# Patient Record
Sex: FEMALE | Race: WHITE | NOT HISPANIC OR LATINO | Employment: UNEMPLOYED | URBAN - METROPOLITAN AREA
[De-identification: names, ages, dates, MRNs, and addresses within clinical notes are randomized per-mention and may not be internally consistent; named-entity substitution may affect disease eponyms.]

---

## 2023-07-31 ENCOUNTER — HOSPITAL ENCOUNTER (OUTPATIENT)
Dept: DATA CONVERSION | Facility: HOSPITAL | Age: 28
Discharge: OTHER INSTITUTION | End: 2023-07-31

## 2023-07-31 DIAGNOSIS — Z53.21 PROCEDURE AND TREATMENT NOT CARRIED OUT DUE TO PATIENT LEAVING PRIOR TO BEING SEEN BY HEALTH CARE PROVIDER: Primary | ICD-10-CM

## 2023-09-25 PROBLEM — F17.210 CIGARETTE SMOKER: Status: ACTIVE | Noted: 2023-09-25

## 2023-09-25 PROBLEM — N80.9 ENDOMETRIOSIS: Status: ACTIVE | Noted: 2023-09-25

## 2023-09-25 PROBLEM — R87.811 VAGINAL HIGH RISK HUMAN PAPILLOMAVIRUS (HPV) DNA TEST POSITIVE: Status: ACTIVE | Noted: 2023-09-25

## 2023-09-25 PROBLEM — N94.6 DYSMENORRHEA: Status: ACTIVE | Noted: 2023-09-25

## 2023-09-25 PROBLEM — R87.610 ATYP SQUAM CELL OF UNDET SIGNFC CYTO SMR CRVX (ASC-US): Status: ACTIVE | Noted: 2023-09-25

## 2023-09-25 PROBLEM — F41.9 ANXIETY DISORDER, UNSPECIFIED: Status: ACTIVE | Noted: 2023-09-25

## 2023-09-25 PROBLEM — F31.9 BIPOLAR DISORDER (MULTI): Status: ACTIVE | Noted: 2023-09-25

## 2023-09-25 PROBLEM — N92.1 MENOMETRORRHAGIA: Status: ACTIVE | Noted: 2023-09-25

## 2023-09-25 PROBLEM — N91.2 AMENORRHEA DUE TO DEPO PROVERA: Status: ACTIVE | Noted: 2023-09-25

## 2023-09-25 RX ORDER — MELOXICAM 15 MG/1
15 TABLET ORAL DAILY
COMMUNITY
Start: 2023-03-06

## 2023-09-25 RX ORDER — NORETHINDRONE 5 MG/1
5 TABLET ORAL DAILY
COMMUNITY
Start: 2023-06-05 | End: 2024-05-03 | Stop reason: SDUPTHER

## 2023-10-11 PROBLEM — N80.9 ENDOMETRIOSIS DETERMINED BY LAPAROSCOPY: Status: ACTIVE | Noted: 2023-10-11

## 2023-10-11 RX ORDER — TRAMADOL HYDROCHLORIDE 50 MG/1
TABLET ORAL
COMMUNITY

## 2023-10-11 RX ORDER — IBUPROFEN 600 MG/1
600 TABLET ORAL EVERY 6 HOURS PRN
COMMUNITY

## 2023-10-24 ENCOUNTER — APPOINTMENT (OUTPATIENT)
Dept: OBSTETRICS AND GYNECOLOGY | Facility: CLINIC | Age: 28
End: 2023-10-24
Payer: COMMERCIAL

## 2024-05-02 ENCOUNTER — TELEPHONE (OUTPATIENT)
Dept: OBSTETRICS AND GYNECOLOGY | Facility: CLINIC | Age: 29
End: 2024-05-02
Payer: COMMERCIAL

## 2024-05-02 ENCOUNTER — APPOINTMENT (OUTPATIENT)
Dept: RADIOLOGY | Facility: HOSPITAL | Age: 29
End: 2024-05-02
Payer: COMMERCIAL

## 2024-05-02 ENCOUNTER — HOSPITAL ENCOUNTER (EMERGENCY)
Facility: HOSPITAL | Age: 29
Discharge: HOME | End: 2024-05-02
Attending: EMERGENCY MEDICINE
Payer: COMMERCIAL

## 2024-05-02 VITALS
TEMPERATURE: 98.5 F | WEIGHT: 135 LBS | DIASTOLIC BLOOD PRESSURE: 63 MMHG | RESPIRATION RATE: 16 BRPM | OXYGEN SATURATION: 99 % | SYSTOLIC BLOOD PRESSURE: 101 MMHG | BODY MASS INDEX: 26.5 KG/M2 | HEART RATE: 68 BPM | HEIGHT: 60 IN

## 2024-05-02 DIAGNOSIS — R10.32 LEFT LOWER QUADRANT PAIN: Primary | ICD-10-CM

## 2024-05-02 DIAGNOSIS — N80.9 ENDOMETRIOSIS: ICD-10-CM

## 2024-05-02 DIAGNOSIS — N80.9 ENDOMETRIOSIS DETERMINED BY LAPAROSCOPY: Primary | ICD-10-CM

## 2024-05-02 LAB
ALBUMIN SERPL BCP-MCNC: 4.5 G/DL (ref 3.4–5)
ALP SERPL-CCNC: 53 U/L (ref 33–110)
ALT SERPL W P-5'-P-CCNC: 10 U/L (ref 7–45)
ANION GAP SERPL CALC-SCNC: 10 MMOL/L (ref 10–20)
APPEARANCE UR: CLEAR
AST SERPL W P-5'-P-CCNC: 17 U/L (ref 9–39)
BASOPHILS # BLD AUTO: 0.06 X10*3/UL (ref 0–0.1)
BASOPHILS NFR BLD AUTO: 0.4 %
BILIRUB SERPL-MCNC: 0.5 MG/DL (ref 0–1.2)
BILIRUB UR STRIP.AUTO-MCNC: NEGATIVE MG/DL
BUN SERPL-MCNC: 14 MG/DL (ref 6–23)
CALCIUM SERPL-MCNC: 9.5 MG/DL (ref 8.6–10.3)
CHLORIDE SERPL-SCNC: 106 MMOL/L (ref 98–107)
CO2 SERPL-SCNC: 28 MMOL/L (ref 21–32)
COLOR UR: ABNORMAL
CREAT SERPL-MCNC: 0.67 MG/DL (ref 0.5–1.05)
EGFRCR SERPLBLD CKD-EPI 2021: >90 ML/MIN/1.73M*2
EOSINOPHIL # BLD AUTO: 0.25 X10*3/UL (ref 0–0.7)
EOSINOPHIL NFR BLD AUTO: 1.5 %
ERYTHROCYTE [DISTWIDTH] IN BLOOD BY AUTOMATED COUNT: 13.5 % (ref 11.5–14.5)
GLUCOSE SERPL-MCNC: 84 MG/DL (ref 74–99)
GLUCOSE UR STRIP.AUTO-MCNC: NORMAL MG/DL
HCG UR QL IA.RAPID: NEGATIVE
HCT VFR BLD AUTO: 41.2 % (ref 36–46)
HGB BLD-MCNC: 13.3 G/DL (ref 12–16)
IMM GRANULOCYTES # BLD AUTO: 0.07 X10*3/UL (ref 0–0.7)
IMM GRANULOCYTES NFR BLD AUTO: 0.4 % (ref 0–0.9)
KETONES UR STRIP.AUTO-MCNC: NEGATIVE MG/DL
LEUKOCYTE ESTERASE UR QL STRIP.AUTO: NEGATIVE
LYMPHOCYTES # BLD AUTO: 4.85 X10*3/UL (ref 1.2–4.8)
LYMPHOCYTES NFR BLD AUTO: 29.1 %
MAGNESIUM SERPL-MCNC: 2.15 MG/DL (ref 1.6–2.4)
MCH RBC QN AUTO: 30.7 PG (ref 26–34)
MCHC RBC AUTO-ENTMCNC: 32.3 G/DL (ref 32–36)
MCV RBC AUTO: 95 FL (ref 80–100)
MONOCYTES # BLD AUTO: 1.2 X10*3/UL (ref 0.1–1)
MONOCYTES NFR BLD AUTO: 7.2 %
MUCOUS THREADS #/AREA URNS AUTO: ABNORMAL /LPF
NEUTROPHILS # BLD AUTO: 10.22 X10*3/UL (ref 1.2–7.7)
NEUTROPHILS NFR BLD AUTO: 61.4 %
NITRITE UR QL STRIP.AUTO: NEGATIVE
NRBC BLD-RTO: 0 /100 WBCS (ref 0–0)
PH UR STRIP.AUTO: 7 [PH]
PLATELET # BLD AUTO: 254 X10*3/UL (ref 150–450)
POTASSIUM SERPL-SCNC: 4.5 MMOL/L (ref 3.5–5.3)
PROT SERPL-MCNC: 7.7 G/DL (ref 6.4–8.2)
PROT UR STRIP.AUTO-MCNC: NEGATIVE MG/DL
RBC # BLD AUTO: 4.33 X10*6/UL (ref 4–5.2)
RBC # UR STRIP.AUTO: ABNORMAL /UL
RBC #/AREA URNS AUTO: >20 /HPF
SODIUM SERPL-SCNC: 139 MMOL/L (ref 136–145)
SP GR UR STRIP.AUTO: 1.02
UROBILINOGEN UR STRIP.AUTO-MCNC: NORMAL MG/DL
WBC # BLD AUTO: 16.7 X10*3/UL (ref 4.4–11.3)
WBC #/AREA URNS AUTO: ABNORMAL /HPF

## 2024-05-02 PROCEDURE — 74177 CT ABD & PELVIS W/CONTRAST: CPT | Performed by: RADIOLOGY

## 2024-05-02 PROCEDURE — 2500000001 HC RX 250 WO HCPCS SELF ADMINISTERED DRUGS (ALT 637 FOR MEDICARE OP): Mod: SE | Performed by: EMERGENCY MEDICINE

## 2024-05-02 PROCEDURE — 83735 ASSAY OF MAGNESIUM: CPT | Performed by: EMERGENCY MEDICINE

## 2024-05-02 PROCEDURE — 96361 HYDRATE IV INFUSION ADD-ON: CPT

## 2024-05-02 PROCEDURE — 80053 COMPREHEN METABOLIC PANEL: CPT | Performed by: EMERGENCY MEDICINE

## 2024-05-02 PROCEDURE — 76857 US EXAM PELVIC LIMITED: CPT | Performed by: RADIOLOGY

## 2024-05-02 PROCEDURE — 76856 US EXAM PELVIC COMPLETE: CPT

## 2024-05-02 PROCEDURE — 96375 TX/PRO/DX INJ NEW DRUG ADDON: CPT

## 2024-05-02 PROCEDURE — 81001 URINALYSIS AUTO W/SCOPE: CPT | Performed by: EMERGENCY MEDICINE

## 2024-05-02 PROCEDURE — 81025 URINE PREGNANCY TEST: CPT | Performed by: EMERGENCY MEDICINE

## 2024-05-02 PROCEDURE — 2500000004 HC RX 250 GENERAL PHARMACY W/ HCPCS (ALT 636 FOR OP/ED): Mod: SE | Performed by: FAMILY MEDICINE

## 2024-05-02 PROCEDURE — 96374 THER/PROPH/DIAG INJ IV PUSH: CPT | Mod: 59

## 2024-05-02 PROCEDURE — 85025 COMPLETE CBC W/AUTO DIFF WBC: CPT | Performed by: EMERGENCY MEDICINE

## 2024-05-02 PROCEDURE — 74177 CT ABD & PELVIS W/CONTRAST: CPT

## 2024-05-02 PROCEDURE — 2550000001 HC RX 255 CONTRASTS: Mod: SE | Performed by: EMERGENCY MEDICINE

## 2024-05-02 PROCEDURE — 2500000004 HC RX 250 GENERAL PHARMACY W/ HCPCS (ALT 636 FOR OP/ED): Mod: SE | Performed by: EMERGENCY MEDICINE

## 2024-05-02 PROCEDURE — 36415 COLL VENOUS BLD VENIPUNCTURE: CPT | Performed by: EMERGENCY MEDICINE

## 2024-05-02 PROCEDURE — 99285 EMERGENCY DEPT VISIT HI MDM: CPT | Mod: 25

## 2024-05-02 RX ORDER — IBUPROFEN 400 MG/1
400 TABLET ORAL ONCE
Status: COMPLETED | OUTPATIENT
Start: 2024-05-02 | End: 2024-05-02

## 2024-05-02 RX ORDER — NAPROXEN 500 MG/1
500 TABLET ORAL
Qty: 20 TABLET | Refills: 0 | Status: SHIPPED | OUTPATIENT
Start: 2024-05-02 | End: 2024-05-12

## 2024-05-02 RX ORDER — KETOROLAC TROMETHAMINE 15 MG/ML
15 INJECTION, SOLUTION INTRAMUSCULAR; INTRAVENOUS ONCE
Status: DISCONTINUED | OUTPATIENT
Start: 2024-05-02 | End: 2024-05-02 | Stop reason: HOSPADM

## 2024-05-02 RX ORDER — MORPHINE SULFATE 4 MG/ML
2 INJECTION, SOLUTION INTRAMUSCULAR; INTRAVENOUS ONCE
Status: COMPLETED | OUTPATIENT
Start: 2024-05-02 | End: 2024-05-02

## 2024-05-02 RX ORDER — KETOROLAC TROMETHAMINE 15 MG/ML
INJECTION, SOLUTION INTRAMUSCULAR; INTRAVENOUS
Status: DISCONTINUED
Start: 2024-05-02 | End: 2024-05-02 | Stop reason: WASHOUT

## 2024-05-02 RX ADMIN — IBUPROFEN 400 MG: 400 TABLET ORAL at 19:08

## 2024-05-02 RX ADMIN — SODIUM CHLORIDE, POTASSIUM CHLORIDE, SODIUM LACTATE AND CALCIUM CHLORIDE 1000 ML: 600; 310; 30; 20 INJECTION, SOLUTION INTRAVENOUS at 17:23

## 2024-05-02 RX ADMIN — IOHEXOL 75 ML: 350 INJECTION, SOLUTION INTRAVENOUS at 17:41

## 2024-05-02 RX ADMIN — SODIUM CHLORIDE, POTASSIUM CHLORIDE, SODIUM LACTATE AND CALCIUM CHLORIDE 1000 ML: 600; 310; 30; 20 INJECTION, SOLUTION INTRAVENOUS at 19:08

## 2024-05-02 RX ADMIN — HYDROMORPHONE HYDROCHLORIDE 0.5 MG: 1 INJECTION, SOLUTION INTRAMUSCULAR; INTRAVENOUS; SUBCUTANEOUS at 19:09

## 2024-05-02 RX ADMIN — MORPHINE SULFATE 2 MG: 4 INJECTION, SOLUTION INTRAMUSCULAR; INTRAVENOUS at 21:14

## 2024-05-02 ASSESSMENT — PAIN SCALES - GENERAL: PAINLEVEL_OUTOF10: 8

## 2024-05-02 ASSESSMENT — PAIN - FUNCTIONAL ASSESSMENT: PAIN_FUNCTIONAL_ASSESSMENT: 0-10

## 2024-05-02 ASSESSMENT — PAIN DESCRIPTION - ORIENTATION: ORIENTATION: LEFT

## 2024-05-02 ASSESSMENT — PAIN DESCRIPTION - FREQUENCY: FREQUENCY: CONSTANT/CONTINUOUS

## 2024-05-02 ASSESSMENT — PAIN DESCRIPTION - DESCRIPTORS: DESCRIPTORS: CRAMPING

## 2024-05-02 ASSESSMENT — PAIN DESCRIPTION - PAIN TYPE: TYPE: ACUTE PAIN

## 2024-05-02 ASSESSMENT — PAIN DESCRIPTION - LOCATION: LOCATION: ABDOMEN

## 2024-05-02 NOTE — ED PROVIDER NOTES
HPI   Chief Complaint   Patient presents with    Abdominal Cramping     On period severe cramping       HPI                    No data recorded                   Patient History   No past medical history on file.  Past Surgical History:   Procedure Laterality Date    MR HEAD ANGIO WO IV CONTRAST  12/21/2018    MR HEAD ANGIO WO IV CONTRAST [facility]    MR NECK ANGIO WO IV CONTRAST  1/18/2019    MR NECK ANGIO WO IV CONTRAST [facility]     Family History   Problem Relation Name Age of Onset    No Known Problems Daughter      Diabetes Maternal Grandmother      Hypertension Maternal Grandmother      Colon cancer Paternal Grandfather      Melanoma Paternal Grandfather      Breast cancer Other          paternal family history     Social History     Tobacco Use    Smoking status: Not on file    Smokeless tobacco: Not on file   Substance Use Topics    Alcohol use: Not on file    Drug use: Not on file       Physical Exam   ED Triage Vitals   Temperature Heart Rate Respirations BP   05/02/24 1506 05/02/24 1506 05/02/24 1506 05/02/24 1506   36.9 °C (98.5 °F) 97 12 115/69      Pulse Ox Temp Source Heart Rate Source Patient Position   05/02/24 1506 05/02/24 1506 05/02/24 1659 05/02/24 1506   98 % Tympanic Monitor Sitting      BP Location FiO2 (%)     05/02/24 1506 --     Left arm        Physical Exam  Constitutional:       General: She is not in acute distress.     Appearance: Normal appearance. She is not toxic-appearing.   HENT:      Head: Normocephalic and atraumatic.      Right Ear: Tympanic membrane normal.      Left Ear: Tympanic membrane normal.      Mouth/Throat:      Mouth: Mucous membranes are moist.      Pharynx: Oropharynx is clear.   Eyes:      Conjunctiva/sclera: Conjunctivae normal.      Pupils: Pupils are equal, round, and reactive to light.   Cardiovascular:      Rate and Rhythm: Normal rate and regular rhythm.      Pulses: Normal pulses.      Heart sounds: Normal heart sounds.   Pulmonary:       Effort: Pulmonary effort is normal. No respiratory distress.      Breath sounds: Normal breath sounds. No wheezing.   Abdominal:      General: Bowel sounds are normal.      Palpations: Abdomen is soft.      Tenderness: There is abdominal tenderness in the left lower quadrant. There is no guarding or rebound.   Musculoskeletal:         General: Normal range of motion.      Cervical back: Normal range of motion.   Skin:     General: Skin is warm and dry.   Neurological:      General: No focal deficit present.      Mental Status: She is alert and oriented to person, place, and time.         ED Course & MDM   Diagnoses as of 05/02/24 1852   Left lower quadrant pain       Medical Decision Making  29-year-old female presents the ER with chief: left lower quadrant pain. Patient reports that she has history of endometriosis. However patient reports she is very tender left lower quadrant we were unable get the ultrasound initially due to the pain. Did do a CAT scan at this point the CAT scan make negative unable to evaluate her ovaries I recommended that she would go back with some pain medication patient agreed to go back to the ultrasound look at her ovaries. Patient care be sent out to the night physician.        Procedure  Procedures     Aakash Vargas,   05/02/24 1852

## 2024-05-02 NOTE — ED NOTES
Toradol refused by patient. Stated the last  she had it, it made her sick to her stomach. Physician made aware     Allison Sinha LPN  05/02/24 [id number]

## 2024-05-02 NOTE — TELEPHONE ENCOUNTER
Pt has h/o endometriosis. Called this morning stating she's in a lot of pain. Has tried heating pad and over the counter pain medication and nothing has helped. Informed the patient to please go to either an urgent care or emergency room if pain becomes too much. Pt last seen 09/19/2023

## 2024-05-03 RX ORDER — NORETHINDRONE 5 MG/1
5 TABLET ORAL DAILY
Qty: 30 TABLET | Refills: 2 | Status: SHIPPED | OUTPATIENT
Start: 2024-05-03

## 2024-05-03 NOTE — TELEPHONE ENCOUNTER
Informed pt via [employer] message that sge needs to be seen in office. Explained that you'll be out and she'll need to be seen by Dane. Also explained this to pt in previous conversation

## 2024-05-03 NOTE — TELEPHONE ENCOUNTER
Pt called back again today with extreme pain, stated she went to urgent care and they sent her to ER last night. Pt stated ER told her there was nothing they could do and to follow up with obgyn. Informed pt that we had two openings this morning. Pt stated she could not make it today. Let pt know the next opening wouldn't be until Wednesday. Pt said she cannot wait that long and asked me to reach out to provider to see what advice she can get or if she can get rx.

## 2024-05-03 NOTE — ED PROVIDER NOTES
Emergency Medicine Transition of Care Note.    I received Emmie De La Cruz in signout from Dr. Vargas.  Please see the previous ED provider note for all HPI, PE and MDM up to the time of signout at 2000. This is in addition to the primary record.    In brief Emmie De La Cruz is an 29 y.o. female presenting for   Chief Complaint   Patient presents with    Abdominal Cramping     On period severe cramping     At the time of signout we were awaiting: On final imaging and workup results, reevaluation, and final disposition.    Diagnoses as of 05/02/24 2104   Left lower quadrant pain   Endometriosis     Labs Reviewed   CBC WITH AUTO DIFFERENTIAL - Abnormal       Result Value    WBC 16.7 (*)     nRBC 0.0      RBC 4.33      Hemoglobin 13.3      Hematocrit 41.2      MCV 95      MCH 30.7      MCHC 32.3      RDW 13.5      Platelets 254      Neutrophils % 61.4      Immature Granulocytes %, Automated 0.4      Lymphocytes % 29.1      Monocytes % 7.2      Eosinophils % 1.5      Basophils % 0.4      Neutrophils Absolute 10.22 (*)     Immature Granulocytes Absolute, Automated 0.07      Lymphocytes Absolute 4.85 (*)     Monocytes Absolute 1.20 (*)     Eosinophils Absolute 0.25      Basophils Absolute 0.06     URINALYSIS WITH REFLEX CULTURE AND MICROSCOPIC - Abnormal    Color, Urine Light-Yellow      Appearance, Urine Clear      Specific Gravity, Urine 1.024      pH, Urine 7.0      Protein, Urine NEGATIVE      Glucose, Urine Normal      Blood, Urine 0.2 (2+) (*)     Ketones, Urine NEGATIVE      Bilirubin, Urine NEGATIVE      Urobilinogen, Urine Normal      Nitrite, Urine NEGATIVE      Leukocyte Esterase, Urine NEGATIVE     URINALYSIS MICROSCOPIC WITH REFLEX CULTURE - Abnormal    WBC, Urine 1-5      RBC, Urine >20 (*)     Mucus, Urine FEW     COMPREHENSIVE METABOLIC PANEL - Normal    Glucose 84      Sodium 139      Potassium 4.5      Chloride 106      Bicarbonate 28      Anion Gap 10      Urea Nitrogen 14      Creatinine 0.67       eGFR >90      Calcium 9.5      Albumin 4.5      Alkaline Phosphatase 53      Total Protein 7.7      AST 17      Bilirubin, Total 0.5      ALT 10     MAGNESIUM - Normal    Magnesium 2.15     HCG, URINE, QUALITATIVE - Normal    HCG, Urine NEGATIVE     URINALYSIS WITH REFLEX CULTURE AND MICROSCOPIC    Narrative:     The following orders were created for panel order Urinalysis with Reflex Culture and Microscopic.  Procedure                               Abnormality         Status                     ---------                               -----------         ------                     Urinalysis with Reflex C...[[id number]]  Abnormal            Final result               Extra Urine Gray Tube[[id number]]                                                         Please view results for these tests on the individual orders.   EXTRA URINE GRAY TUBE     US PELVIS TRANSABDOMINAL WITH TRANSVAGINAL   Final Result   Unremarkable pelvic ultrasound.             MACRO:   None.        Signed by: Ori Fernandez 5/2/2024 8:48 PM   Dictation workstation:   [id number]      US pelvis   Final Result   Unremarkable transabdominal sonographic appearance of the uterus.        Nonvisualization of the ovaries due to bowel gas artifact and limited   transabdominal technique.        MACRO:   None        Signed by: Martin Robin 5/2/2024 6:22 PM   Dictation workstation:   [id number]      CT abdomen pelvis w IV contrast   Final Result   No acute abdominal or pelvic process.        Additional findings as noted above.        MACRO:   None        Signed by: Martin Robin 5/2/2024 6:22 PM   Dictation workstation:   [id number]            Medical Decision Making    After assuming care from previous physician patient was reevaluated and continued appear to be feeling better since arrival to the ED and continued treatment.  Patient requesting further pain medication was given further IV morphine for management.  Patient was able to tolerate p.o.  fluids and had no new physical complaints.  Vital signs continue appropriate and patient's results were reviewed with her.  Final imaging results were noted and reviewed with her and findings felt to be more consistent with patient's chronic endometriosis.  Patient was encouraged at this time to follow-up with her OB/GYN for continued management of this chronic ailment and to aid with her treatment.  Patient was educated the provide for home and advised return to ED she feels her symptoms continue to recur and persist despite the recommendations.  Patient stable and discharged home.      Final diagnoses:   [R10.32] Left lower quadrant pain   [N80.9] Endometriosis           Procedure  Procedures    MD Vincent Guillen MD  05/02/24 0529

## 2024-09-10 ENCOUNTER — TELEPHONE (OUTPATIENT)
Dept: OBSTETRICS AND GYNECOLOGY | Facility: CLINIC | Age: 29
End: 2024-09-10
Payer: COMMERCIAL

## 2024-09-10 NOTE — TELEPHONE ENCOUNTER
My called this morning with complaints of extreme pain. Pt has h/o endometriosis with appt on 09/16/2024. Pt states she had episode similar to this in May and went to hospital but they did not do anything to help. Pt called cause she's unsure what to do next. Pt states she's in so much pain she can barely move.

## 2024-09-11 ENCOUNTER — OFFICE VISIT (OUTPATIENT)
Dept: OBSTETRICS AND GYNECOLOGY | Facility: CLINIC | Age: 29
End: 2024-09-11
Payer: COMMERCIAL

## 2024-09-11 VITALS
HEIGHT: 60 IN | WEIGHT: 141.6 LBS | BODY MASS INDEX: 27.8 KG/M2 | SYSTOLIC BLOOD PRESSURE: 110 MMHG | DIASTOLIC BLOOD PRESSURE: 74 MMHG

## 2024-09-11 DIAGNOSIS — N92.0 MENORRHAGIA WITH REGULAR CYCLE: Primary | ICD-10-CM

## 2024-09-11 DIAGNOSIS — N80.9 ENDOMETRIOSIS: ICD-10-CM

## 2024-09-11 PROCEDURE — 99213 OFFICE O/P EST LOW 20 MIN: CPT | Performed by: OBSTETRICS & GYNECOLOGY

## 2024-09-11 PROCEDURE — 3008F BODY MASS INDEX DOCD: CPT | Performed by: OBSTETRICS & GYNECOLOGY

## 2024-09-11 RX ORDER — MEDROXYPROGESTERONE ACETATE 5 MG/1
5 TABLET ORAL DAILY
Qty: 30 TABLET | Refills: 3 | Status: SHIPPED | OUTPATIENT
Start: 2024-09-11 | End: 2025-01-09

## 2024-09-11 ASSESSMENT — PATIENT HEALTH QUESTIONNAIRE - PHQ9
1. LITTLE INTEREST OR PLEASURE IN DOING THINGS: NOT AT ALL
2. FEELING DOWN, DEPRESSED OR HOPELESS: NOT AT ALL
SUM OF ALL RESPONSES TO PHQ9 QUESTIONS 1 AND 2: 0

## 2024-09-11 ASSESSMENT — ENCOUNTER SYMPTOMS
DEPRESSION: 0
LOSS OF SENSATION IN FEET: 0
OCCASIONAL FEELINGS OF UNSTEADINESS: 0

## 2024-09-11 ASSESSMENT — PAIN SCALES - GENERAL: PAINLEVEL: 9

## 2024-09-11 NOTE — PROGRESS NOTES
"GYN OFFICE VISIT    Patient Name:  Emmie De La Cruz  :  1995  MR #:  [id number]  Acct #:  [id number]      ASSESSMENT/PLAN:     There are no diagnoses linked to this encounter.     Emmie was seen today for pelvic pain.  Diagnoses and all orders for this visit:  Menorrhagia with regular cycle (Primary)  -     CBC; Future  -     Estradiol; Future  -     Follicle Stimulating Hormone; Future  -     TSH with reflex to Free T4 if abnormal; Future  -     Hemoglobin A1C; Future  -     Prolactin; Future  -     Testosterone,Free and Total; Future  -     Luteinizing Hormone; Future  -     medroxyPROGESTERone (Provera) 5 mg tablet; Take 1 tablet (5 mg) by mouth once daily.  Endometriosis  -     medroxyPROGESTERone (Provera) 5 mg tablet; Take 1 tablet (5 mg) by mouth once daily.      Endometriosis determined by laparoscopy  Pt requests definitive tx, and is s/p bilateral salpingectomy.  Labs/USN ordered, will start progestin therapy again.        .All questions answered.  Diagnosis explained in detail, including differential.  Discussed healthy lifestyle modifications.    No follow-ups on file.      Subjective    Chief Complaint   Patient presents with    Pelvic Pain       Emmie De La Cruz is a 29 y.o.  Patient's last menstrual period was 2024 (exact date).   female who presents for follow up regarding endometriosis.  Was seen in ER, \"where nothing was done:.  Debilitating pain, with heavy menses with pad changes q 30-90 min.  Has completed childbearing, and requests hysterectomy.      History reviewed. No pertinent past medical history.    Past Surgical History:   Procedure Laterality Date    CERVICAL BIOPSY  W/ LOOP ELECTRODE EXCISION      COLPOSCOPY      MR HEAD ANGIO WO IV CONTRAST  2018    MR HEAD ANGIO WO IV CONTRAST [facility]    MR NECK ANGIO WO IV CONTRAST  2019    MR NECK ANGIO WO IV CONTRAST [facility]    SALPINGECTOMY         Social History     Socioeconomic " History    Marital status: Single     Spouse name: Not on file    Number of children: 3    Years of education: Not on file    Highest education level: Not on file   Occupational History    Not on file   Tobacco Use    Smoking status: Every Day     Current packs/day: 1.00     Average packs/day: 1 pack/day for 17.7 years (17.7 ttl pk-yrs)     Types: Cigarettes     Start date:     Smokeless tobacco: Never   Vaping Use    Vaping status: Never Used   Substance and Sexual Activity    Alcohol use: Not Currently    Drug use: Never    Sexual activity: Yes     Birth control/protection: OCP, Female Sterilization   Other Topics Concern    Not on file   Social History Narrative    Not on file     Social Determinants of Health     Financial Resource Strain: Not on File (2022)    Received from [facility]    Financial Resource Strain     Financial Resource Strain: 0   Food Insecurity: Not on file (2024)   Transportation Needs: Not on File (2022)    Received from [facility]    Transportation Needs     Transportation: 0   Physical Activity: Not on File (2022)    Received from [facility]    Physical Activity     Physical Activity: 0   Stress: Not on File (2022)    Received from [facility]    Stress     Stress: 0   Social Connections: Not on File (2022)    Received from [facility]    Social Connections     Social Connections and Isolation: 0   Intimate Partner Violence: Not on file   Housing Stability: Not on File (2022)    Received from [facility]    Housing Stability     Housin       Family History   Problem Relation Name Age of Onset    Cervical cancer Mother      No Known Problems Father      No Known Problems Daughter      Cervical cancer Father's Sister      Diabetes Maternal Grandmother      Hypertension Maternal Grandmother      Cervical cancer Paternal Grandmother      Colon cancer Paternal Grandfather      Melanoma Paternal Grandfather      Breast cancer Other           paternal family history       Prior to Admission medications    Medication Sig Start Date End Date Taking? Authorizing Provider   ACETAMINOPHEN ORAL Take 2 tablets by mouth every 6 hours if needed (pain). 12/14/21  Yes Historical Provider, MD   ibuprofen 600 mg tablet Take 1 tablet (600 mg) by mouth every 6 hours if needed for mild pain (1 - 3).   Yes Historical Provider, MD   meloxicam (Mobic) 15 mg tablet Take 1 tablet (15 mg) by mouth once daily. 3/6/23  Yes Historical Provider, MD   norethindrone (Aygestin) 5 mg tablet Take 1 tablet (5 mg) by mouth once daily. 5/3/24  Yes Tia Russo,    traMADol (Ultram) 50 mg tablet Take by mouth.    Historical Provider, MD       Allergies   Allergen Reactions    Nitrofurantoin Monohyd/M-Cryst Other     Reaction: vomiting              OBJECTIVE:   /74   Ht 1.524 m (5')   Wt 64.2 kg (141 lb 9.6 oz)   LMP 09/08/2024 (Exact Date)   BMI 27.65 kg/m²   Body mass index is 27.65 kg/m².     Physical Exam  Vitals and nursing note reviewed.   Constitutional:       General: She is not in acute distress.  Cardiovascular:      Rate and Rhythm: Normal rate and regular rhythm.      Heart sounds: No murmur heard.     No friction rub. No gallop.   Pulmonary:      Effort: Pulmonary effort is normal.      Breath sounds: Normal breath sounds. No wheezing or rales.   Abdominal:      General: Bowel sounds are normal. There is no distension.      Palpations: Abdomen is soft. There is no mass.      Tenderness: There is no abdominal tenderness. There is no guarding or rebound.      Hernia: No hernia is present.   Genitourinary:     General: Normal vulva.      Labia:         Right: No rash or lesion.         Left: No rash or lesion.       Urethra: No urethral pain or urethral swelling.      Vagina: No erythema, tenderness, bleeding or lesions.      Cervix: No cervical motion tenderness, discharge, friability, lesion, erythema, cervical bleeding or eversion.      Uterus: Normal. Not  enlarged and not tender.       Adnexa:         Right: No mass, tenderness or fullness.          Left: No mass, tenderness or fullness.     Neurological:      Mental Status: She is alert.   Psychiatric:         Mood and Affect: Mood normal.         Behavior: Behavior normal.         Thought Content: Thought content normal.         Judgment: Judgment normal.             Imaging reviewed   Usn   US PELVIS TRANSABDOMINAL WITH TRANSVAGINAL;  5/2/2024 8:23 pm      INDICATION:  Signs/Symptoms:Evaluate patients ovaries.      COMPARISON:  CT abdomen/pelvis 05/02/2024, transabdominal pelvic ultrasound  05/02/2024.      ACCESSION NUMBER(S):  [id number]      ORDERING CLINICIAN:  AMBAR QUACH      TECHNIQUE:  Transabdominal and transvaginal sonographic images of the pelvis.  Spectral Doppler imaging.      FINDINGS:  UTERUS: The uterus has a homogeneous echotexture, is anteverted and  measures 8.4 x 4.2 x 4.8 cm.      ENDOMETRIUM: The endometrium measures 0.3 cm in thickness.      CERVIX: Closed.          RIGHT OVARY: Only seen on transvaginal imaging. The right ovary  measures 3.2 x 2 x 3 cm and demonstrates intact arterial and venous  color and spectral Doppler flow. Multiple normal follicles are seen.  No suspicious adnexal mass is seen.      LEFT OVARY: Only seen on transvaginal imaging. The left ovary  measures 2.9 x 2.2 x 2.4 cm and demonstrates intact arterial and  venous color and spectral Doppler flow. Multiple normal follicles are  seen. No suspicious adnexal mass is seen.          FLUID: No free fluid.      IMPRESSION:  Unremarkable pelvic ultrasound.        Note: This dictation was generated using Dragon voice recognition software. Please excuse any grammatical or spelling errors that may have occurred using the system.

## 2024-09-16 ENCOUNTER — APPOINTMENT (OUTPATIENT)
Dept: OBSTETRICS AND GYNECOLOGY | Facility: CLINIC | Age: 29
End: 2024-09-16
Payer: COMMERCIAL

## 2024-09-16 NOTE — ASSESSMENT & PLAN NOTE
Pt requests definitive tx, and is s/p bilateral salpingectomy.  Labs/USN ordered, will start progestin therapy again.

## 2024-10-18 ENCOUNTER — OFFICE VISIT (OUTPATIENT)
Dept: OBSTETRICS AND GYNECOLOGY | Facility: CLINIC | Age: 29
End: 2024-10-18
Payer: COMMERCIAL

## 2024-10-18 VITALS
SYSTOLIC BLOOD PRESSURE: 100 MMHG | DIASTOLIC BLOOD PRESSURE: 81 MMHG | BODY MASS INDEX: 27.88 KG/M2 | HEIGHT: 60 IN | WEIGHT: 142 LBS

## 2024-10-18 DIAGNOSIS — Z01.419 WELL WOMAN EXAM WITH ROUTINE GYNECOLOGICAL EXAM: Primary | ICD-10-CM

## 2024-10-18 DIAGNOSIS — N92.0 MENORRHAGIA WITH REGULAR CYCLE: ICD-10-CM

## 2024-10-18 DIAGNOSIS — R87.610 ATYPICAL SQUAMOUS CELL CHANGES OF UNDETERMINED SIGNIFICANCE (ASCUS) ON CERVICAL CYTOLOGY WITH NEGATIVE HIGH RISK HUMAN PAPILLOMA VIRUS (HPV) TEST RESULT: ICD-10-CM

## 2024-10-18 DIAGNOSIS — N94.6 DYSMENORRHEA: ICD-10-CM

## 2024-10-18 PROCEDURE — 99395 PREV VISIT EST AGE 18-39: CPT | Performed by: OBSTETRICS & GYNECOLOGY

## 2024-10-18 PROCEDURE — 99213 OFFICE O/P EST LOW 20 MIN: CPT | Performed by: OBSTETRICS & GYNECOLOGY

## 2024-10-18 PROCEDURE — 3008F BODY MASS INDEX DOCD: CPT | Performed by: OBSTETRICS & GYNECOLOGY

## 2024-10-18 RX ORDER — HYDROCODONE BITARTRATE AND ACETAMINOPHEN 5; 325 MG/1; MG/1
1 TABLET ORAL EVERY 6 HOURS PRN
Qty: 20 TABLET | Refills: 0 | Status: SHIPPED | OUTPATIENT
Start: 2024-10-18 | End: 2024-10-25

## 2024-10-18 ASSESSMENT — ENCOUNTER SYMPTOMS
OCCASIONAL FEELINGS OF UNSTEADINESS: 0
DEPRESSION: 0
LOSS OF SENSATION IN FEET: 0

## 2024-10-18 ASSESSMENT — PAIN SCALES - GENERAL: PAINLEVEL_OUTOF10: 8

## 2024-10-18 ASSESSMENT — PATIENT HEALTH QUESTIONNAIRE - PHQ9
1. LITTLE INTEREST OR PLEASURE IN DOING THINGS: NOT AT ALL
SUM OF ALL RESPONSES TO PHQ9 QUESTIONS 1 AND 2: 0
2. FEELING DOWN, DEPRESSED OR HOPELESS: NOT AT ALL

## 2024-10-18 NOTE — PROGRESS NOTES
Subjective   Emmie De La Cruz is a 29 y.o. female who is here for a routine exam. Periods are regular every 28-30 days, lasting several days. Dysmenorrhea:severe, occurring throughout menses. Cyclic symptoms include anxiety, constipation, headache, pelvic pain, and tension. No intermenstrual bleeding, spotting, or discharge.  Still with irregular bleeding and pelvic pain despite provera 5 mg dailly.  Notes worsening IBS with this.  Known endometriosis.      Last pap:    Last mammogram  Current contraception:  peorvera 5 mg daily po  History of abnormal Pap smear: no  Family history of uterine or ovarian cancer: no  Regular self breast exam: yes  History of abnormal mammogram: no  Family history of breast cancer: no  History of abnormal lipids: no  Menstrual History:  OB History          3    Para   3    Term                AB        Living   3         SAB        IAB        Ectopic        Multiple        Live Births   3                  Patient's last menstrual period was 10/04/2024 (exact date).         History reviewed. No pertinent past medical history.    Past Surgical History:   Procedure Laterality Date    CERVICAL BIOPSY  W/ LOOP ELECTRODE EXCISION      COLPOSCOPY      MR HEAD ANGIO WO IV CONTRAST  2018    MR HEAD ANGIO WO IV CONTRAST [facility]    MR NECK ANGIO WO IV CONTRAST  2019    MR NECK ANGIO WO IV CONTRAST [facility]    SALPINGECTOMY         Social History     Socioeconomic History    Marital status: Single     Spouse name: Not on file    Number of children: 3    Years of education: Not on file    Highest education level: Not on file   Occupational History    Not on file   Tobacco Use    Smoking status: Every Day     Current packs/day: 1.00     Average packs/day: 1 pack/day for 17.8 years (17.8 ttl pk-yrs)     Types: Cigarettes     Start date:     Smokeless tobacco: Never   Vaping Use    Vaping status: Never Used   Substance and Sexual Activity     Alcohol use: Not Currently    Drug use: Never    Sexual activity: Yes     Birth control/protection: OCP, Female Sterilization   Other Topics Concern    Not on file   Social History Narrative    Not on file     Social Drivers of Health     Financial Resource Strain: Not on File (2022)    Received from [facility]    Financial Resource Strain     Financial Resource Strain: 0   Food Insecurity: Not on File (2024)    Received from [facility]    Food Insecurity     Food: 0   Transportation Needs: Not on File (2022)    [facility] from [facility]    Transportation Needs     Transportation: 0   Physical Activity: Not on File (2022)    Received from [facility]    Physical Activity     Physical Activity: 0   Stress: Not on File (2022)    Received from [facility]    Stress     Stress: 0   Social Connections: Not on File (2024)    Received from [facility]    Social Connections     Connectedness: 0   Intimate Partner Violence: Not on file   Housing Stability: Not on File (2022)    Received from [facility]    Housing Stability     Housin       Family History   Problem Relation Name Age of Onset    Cervical cancer Mother      No Known Problems Father      No Known Problems Daughter      Cervical cancer Father's Sister      Diabetes Maternal Grandmother      Hypertension Maternal Grandmother      Cervical cancer Paternal Grandmother      Colon cancer Paternal Grandfather      Melanoma Paternal Grandfather      Breast cancer Other          paternal family history       Prior to Admission medications    Medication Sig Start Date End Date Taking? Authorizing Provider   ACETAMINOPHEN ORAL Take 2 tablets by mouth every 6 hours if needed (pain). 21   Historical Provider, MD   ibuprofen 600 mg tablet Take 1 tablet (600 mg) by mouth every 6 hours if needed for mild pain (1 - 3).    Historical Provider, MD   medroxyPROGESTERone (Provera) 5 mg tablet Take 1 tablet (5 mg) by mouth once daily. 24  1/9/25  Tia Russo DO   meloxicam (Mobic) 15 mg tablet Take 1 tablet (15 mg) by mouth once daily. 3/6/23   Historical Provider, MD   norethindrone (Aygestin) 5 mg tablet Take 1 tablet (5 mg) by mouth once daily. 5/3/24 10/18/24  Tia Russo DO   traMADol (Ultram) 50 mg tablet Take by mouth.  10/18/24  Historical Provider, MD       Allergies   Allergen Reactions    Nitrofurantoin Monohyd/M-Cryst Other     Reaction: vomiting              Objective   /81   Ht 1.524 m (5')   Wt 64.4 kg (142 lb)   LMP 10/04/2024 (Exact Date)   BMI 27.73 kg/m²     Physical Exam  Vitals and nursing note reviewed.   Constitutional:       General: She is not in acute distress.     Appearance: Normal appearance. She is not ill-appearing.   HENT:      Head: Normocephalic and atraumatic.      Mouth/Throat:      Mouth: Mucous membranes are moist.      Pharynx: Oropharynx is clear.   Eyes:      Extraocular Movements: Extraocular movements intact.      Conjunctiva/sclera: Conjunctivae normal.      Pupils: Pupils are equal, round, and reactive to light.   Neck:      Thyroid: No thyroid mass, thyromegaly or thyroid tenderness.   Cardiovascular:      Rate and Rhythm: Normal rate and regular rhythm.      Pulses: Normal pulses.      Heart sounds: Normal heart sounds. No murmur heard.  Pulmonary:      Effort: Pulmonary effort is normal.      Breath sounds: No wheezing or rhonchi.   Chest:   Breasts:     Right: Normal. No swelling, bleeding, inverted nipple, mass, nipple discharge, skin change or tenderness.      Left: Normal. No swelling, bleeding, inverted nipple, mass, nipple discharge, skin change or tenderness.   Abdominal:      General: Bowel sounds are normal. There is no distension.      Palpations: There is no mass.      Tenderness: There is no abdominal tenderness. There is no guarding or rebound.      Hernia: No hernia is present. There is no hernia in the left inguinal area or right inguinal area.   Genitourinary:      General: Normal vulva.      Pubic Area: No rash.       Labia:         Right: No rash, tenderness, lesion or injury.         Left: No rash, tenderness, lesion or injury.       Urethra: No prolapse or urethral swelling.      Vagina: No signs of injury. No vaginal discharge, tenderness or prolapsed vaginal walls.      Cervix: No cervical motion tenderness, discharge, friability, lesion, erythema or cervical bleeding.      Uterus: Not deviated, not enlarged, not fixed, not tender and no uterine prolapse.       Adnexa:         Right: No mass, tenderness or fullness.          Left: No mass, tenderness or fullness.        Comments: US nodularity noted.  Musculoskeletal:         General: No swelling, tenderness, deformity or signs of injury. Normal range of motion.      Cervical back: No rigidity.   Lymphadenopathy:      Cervical: No cervical adenopathy.      Upper Body:      Right upper body: No axillary adenopathy.      Left upper body: No axillary adenopathy.      Lower Body: No right inguinal adenopathy. No left inguinal adenopathy.   Skin:     General: Skin is warm.      Findings: No lesion or rash.   Neurological:      General: No focal deficit present.      Mental Status: She is alert and oriented to person, place, and time.   Psychiatric:         Mood and Affect: Mood normal.         Behavior: Behavior normal.         Thought Content: Thought content normal.         Judgment: Judgment normal.         Assessment    Problem List Items Addressed This Visit       Atypical squamous cell changes of undetermined significance (ASCUS) on cervical cytology with negative high risk human papilloma virus (HPV) test result    Relevant Orders    THINPREP PAP    Dysmenorrhea     Repeat USN.  Pt has completed childbearing, failed hormonal tx.  Wants RATLH with bilteral sapingectomy.  Regarding pain control, poor tolerance to all nsaids.  Will privde liited rx for norco.  Single filling prior to anticipated. Surgery.   [facility]  [facility] after [facility] report reviewed.         Relevant Medications    HYDROcodone-acetaminophen (Norco) 5-325 mg tablet    Other Relevant Orders    US PELVIS TRANSABDOMINAL WITH TRANSVAGINAL    Menorrhagia with regular cycle    Relevant Orders    US PELVIS TRANSABDOMINAL WITH TRANSVAGINAL     Other Visit Diagnoses       Well woman exam with routine gynecological exam    -  Primary    Relevant Orders    US PELVIS TRANSABDOMINAL WITH TRANSVAGINAL    THINPREP PAP             Follow up for after ultrasound.   All questions answered.  Breast self exam technique reviewed and patient encouraged to perform self-exam monthly.  Diagnosis explained in detail, including differential.  Discussed healthy lifestyle modifications..    PROBLEM:    Still with irregular bleeding and pelvic pain despite provera 5 mg dailly.  Notes worsening IBS with this.  Known endometriosis.    Pmhx and PE as above    Dismenorhea, menorrhagia  Repeat USN.  Pt as above

## 2024-10-20 ENCOUNTER — HOSPITAL ENCOUNTER (OUTPATIENT)
Dept: RADIOLOGY | Facility: HOSPITAL | Age: 29
Discharge: HOME | End: 2024-10-20
Payer: COMMERCIAL

## 2024-10-20 DIAGNOSIS — N94.6 DYSMENORRHEA: ICD-10-CM

## 2024-10-20 DIAGNOSIS — Z01.419 WELL WOMAN EXAM WITH ROUTINE GYNECOLOGICAL EXAM: ICD-10-CM

## 2024-10-20 DIAGNOSIS — N92.0 MENORRHAGIA WITH REGULAR CYCLE: ICD-10-CM

## 2024-10-20 PROCEDURE — 76856 US EXAM PELVIC COMPLETE: CPT

## 2024-10-20 PROCEDURE — 76830 TRANSVAGINAL US NON-OB: CPT | Performed by: RADIOLOGY

## 2024-10-20 PROCEDURE — 76856 US EXAM PELVIC COMPLETE: CPT | Performed by: RADIOLOGY

## 2024-10-21 ENCOUNTER — TELEPHONE (OUTPATIENT)
Dept: OBSTETRICS AND GYNECOLOGY | Facility: HOSPITAL | Age: 29
End: 2024-10-21
Payer: COMMERCIAL

## 2024-10-21 NOTE — ASSESSMENT & PLAN NOTE
Repeat USN.  Pt has completed childbearing, failed hormonal tx.  Wants RATLH with bilteral sapingectomy.  Regarding pain control, poor tolerance to all nsaids.  Will privde liited rx for norco.  Single filling prior to anticipated. Surgery.  [facility] precribing agreement signed after [facility] report reviewed.

## 2024-10-21 NOTE — TELEPHONE ENCOUNTER
Reviewed Usn findings, endometrium is thicker than anticipated given progestin therapy.  Otherwise, normal usn.  Pt wants to move forward with RATLH b/l salpingectomy.  Hx of endometriosis, reviewed risk/benefits of BSO vs ovarian preservation vs salpingectomy at time of surgery. Pt wishes to preseve ovaries.  Will schedule for [facility] with b/l salpingectomy and possible excision of endometriosis.    Pt is s/p salpingectomy.

## 2024-10-25 ENCOUNTER — PREP FOR PROCEDURE (OUTPATIENT)
Dept: OBSTETRICS AND GYNECOLOGY | Facility: CLINIC | Age: 29
End: 2024-10-25
Payer: COMMERCIAL

## 2024-10-25 DIAGNOSIS — N80.9 ENDOMETRIOSIS DETERMINED BY LAPAROSCOPY: ICD-10-CM

## 2024-10-25 DIAGNOSIS — R10.2 PELVIC PAIN IN FEMALE: Primary | ICD-10-CM

## 2024-10-25 RX ORDER — CEFAZOLIN SODIUM 2 G/100ML
2 INJECTION, SOLUTION INTRAVENOUS ONCE
OUTPATIENT
Start: 2024-10-25 | End: 2024-10-25

## 2024-10-25 RX ORDER — CELECOXIB 400 MG/1
400 CAPSULE ORAL ONCE
OUTPATIENT
Start: 2024-10-25 | End: 2024-10-25

## 2024-10-25 RX ORDER — GABAPENTIN 600 MG/1
600 TABLET ORAL ONCE
OUTPATIENT
Start: 2024-10-25 | End: 2024-10-25

## 2024-10-25 RX ORDER — ACETAMINOPHEN 325 MG/1
975 TABLET ORAL ONCE
OUTPATIENT
Start: 2024-10-25 | End: 2024-10-25

## 2024-10-28 PROBLEM — R10.2 PELVIC PAIN IN FEMALE: Status: ACTIVE | Noted: 2024-10-25

## 2024-11-01 LAB
CYTOLOGY CMNT CVX/VAG CYTO-IMP: NORMAL
LAB AP HPV GENOTYPE QUESTION: YES
LAB AP HPV HR: NORMAL
LAB AP PREVIOUS ABNORMAL HISTORY: NORMAL
LABORATORY COMMENT REPORT: NORMAL
LABORATORY COMMENT REPORT: NORMAL
LMP START DATE: NORMAL
PATH REPORT.TOTAL CANCER: NORMAL

## 2024-11-12 ENCOUNTER — TELEPHONE (OUTPATIENT)
Dept: OBSTETRICS AND GYNECOLOGY | Facility: CLINIC | Age: 29
End: 2024-11-12
Payer: COMMERCIAL

## 2024-11-12 NOTE — TELEPHONE ENCOUNTER
Pt called req to cancel colpo prior to surgery - wanting to know if this is OK, does not wish to come in prior to surgery advised pt she most likely will need this prior to surgery req to speak to you

## 2024-11-12 NOTE — TELEPHONE ENCOUNTER
Spoke with pt:  reviewed importance of the colpo and need to delay surgery to get this done if we can't do this as scheduled.  Pt states she will try to make arrangements with work.

## 2024-11-15 ENCOUNTER — PROCEDURE VISIT (OUTPATIENT)
Dept: OBSTETRICS AND GYNECOLOGY | Facility: CLINIC | Age: 29
End: 2024-11-15
Payer: COMMERCIAL

## 2024-11-15 VITALS
BODY MASS INDEX: 28.21 KG/M2 | DIASTOLIC BLOOD PRESSURE: 68 MMHG | SYSTOLIC BLOOD PRESSURE: 112 MMHG | WEIGHT: 143.7 LBS | HEIGHT: 60 IN

## 2024-11-15 DIAGNOSIS — R87.612 LGSIL ON PAP SMEAR OF CERVIX: Primary | ICD-10-CM

## 2024-11-15 PROCEDURE — 57454 BX/CURETT OF CERVIX W/SCOPE: CPT | Performed by: OBSTETRICS & GYNECOLOGY

## 2024-11-15 ASSESSMENT — PATIENT HEALTH QUESTIONNAIRE - PHQ9
2. FEELING DOWN, DEPRESSED OR HOPELESS: NOT AT ALL
SUM OF ALL RESPONSES TO PHQ9 QUESTIONS 1 AND 2: 0
1. LITTLE INTEREST OR PLEASURE IN DOING THINGS: NOT AT ALL

## 2024-11-15 ASSESSMENT — ENCOUNTER SYMPTOMS
LOSS OF SENSATION IN FEET: 0
DEPRESSION: 0
OCCASIONAL FEELINGS OF UNSTEADINESS: 0

## 2024-11-15 ASSESSMENT — PAIN SCALES - GENERAL: PAINLEVEL_OUTOF10: 7

## 2024-11-15 NOTE — PROGRESS NOTES
Patient ID: Emmie De La Cruz is a 29 y.o. female.    Colposcopy    Date/Time: 11/15/2024 12:15 PM    Performed by: Tia Russo DO  Authorized by: Tia Russo DO    Procedure location: cervix    Consent:     Patient questions answered: yes      Risks and benefits of the procedure and its alternatives discussed: yes      Procedural risks discussed:  Bleeding, possible conversion to open surgery, repeat procedure and possible loss of function    Consent obtained:  Written    Consent given by:  Patient  Indication:     Cervical indication(s): cervical LSIL    Pre-procedure:     Speculum was placed in the vagina: yes      Prep solution(s): acetic acid    Procedure:     Colposcopy with: cervical biopsy and endocervical curettage      Biopsy taken: yes      # of biopsies:  2    Biopsy location(s): ECC and 10 oclock    Cervix visibility: fully visualized      SCJ visibility: fully visualized      Lesion visualized: fully visualized      Acetowhite lesion(s): cervix      Acetowhite lesion(s) description:  Circumferentally    Vascular changes: cervix      Vascular lesion description:  Punctation 10 oclock    Cervical impression: low grade      Ferric subsulfate solution applied: yes    Post-procedure:     Patient tolerance of procedure:  Patient tolerated the procedure well with no immediate complications    Instructions and paperwork completed: yes      Educational handouts given: yes        Physical Exam  Genitourinary:

## 2024-11-21 LAB
LABORATORY COMMENT REPORT: NORMAL
PATH REPORT.FINAL DX SPEC: NORMAL
PATH REPORT.GROSS SPEC: NORMAL
PATH REPORT.RELEVANT HX SPEC: NORMAL
PATH REPORT.TOTAL CANCER: NORMAL

## 2024-11-27 ENCOUNTER — APPOINTMENT (OUTPATIENT)
Dept: PREADMISSION TESTING | Facility: HOSPITAL | Age: 29
End: 2024-11-27
Payer: COMMERCIAL

## 2024-12-05 ENCOUNTER — PRE-ADMISSION TESTING (OUTPATIENT)
Dept: PREADMISSION TESTING | Facility: HOSPITAL | Age: 29
End: 2024-12-05
Payer: COMMERCIAL

## 2024-12-05 VITALS
HEART RATE: 94 BPM | RESPIRATION RATE: 16 BRPM | WEIGHT: 148 LBS | BODY MASS INDEX: 29.06 KG/M2 | TEMPERATURE: 98.8 F | OXYGEN SATURATION: 98 % | SYSTOLIC BLOOD PRESSURE: 117 MMHG | DIASTOLIC BLOOD PRESSURE: 66 MMHG | HEIGHT: 60 IN

## 2024-12-05 DIAGNOSIS — Z01.818 PRE-OP TESTING: Primary | ICD-10-CM

## 2024-12-05 LAB
ABO GROUP (TYPE) IN BLOOD: NORMAL
ANION GAP SERPL CALCULATED.3IONS-SCNC: 10 MMOL/L (ref 10–20)
ANTIBODY SCREEN: NORMAL
BASOPHILS # BLD AUTO: 0.08 X10*3/UL (ref 0–0.1)
BASOPHILS NFR BLD AUTO: 0.6 %
BUN SERPL-MCNC: 12 MG/DL (ref 6–23)
CALCIUM SERPL-MCNC: 9.2 MG/DL (ref 8.6–10.3)
CHLORIDE SERPL-SCNC: 106 MMOL/L (ref 98–107)
CO2 SERPL-SCNC: 27 MMOL/L (ref 21–32)
CREAT SERPL-MCNC: 0.61 MG/DL (ref 0.5–1.05)
EGFRCR SERPLBLD CKD-EPI 2021: >90 ML/MIN/1.73M*2
EOSINOPHIL # BLD AUTO: 0.43 X10*3/UL (ref 0–0.7)
EOSINOPHIL NFR BLD AUTO: 3 %
ERYTHROCYTE [DISTWIDTH] IN BLOOD BY AUTOMATED COUNT: 12.7 % (ref 11.5–14.5)
GLUCOSE SERPL-MCNC: 89 MG/DL (ref 74–99)
HCT VFR BLD AUTO: 37.7 % (ref 36–46)
HGB BLD-MCNC: 12 G/DL (ref 12–16)
IMM GRANULOCYTES # BLD AUTO: 0.05 X10*3/UL (ref 0–0.7)
IMM GRANULOCYTES NFR BLD AUTO: 0.4 % (ref 0–0.9)
LYMPHOCYTES # BLD AUTO: 3.57 X10*3/UL (ref 1.2–4.8)
LYMPHOCYTES NFR BLD AUTO: 25.3 %
MCH RBC QN AUTO: 29.8 PG (ref 26–34)
MCHC RBC AUTO-ENTMCNC: 31.8 G/DL (ref 32–36)
MCV RBC AUTO: 94 FL (ref 80–100)
MONOCYTES # BLD AUTO: 1.24 X10*3/UL (ref 0.1–1)
MONOCYTES NFR BLD AUTO: 8.8 %
NEUTROPHILS # BLD AUTO: 8.75 X10*3/UL (ref 1.2–7.7)
NEUTROPHILS NFR BLD AUTO: 61.9 %
NRBC BLD-RTO: 0 /100 WBCS (ref 0–0)
PLATELET # BLD AUTO: 229 X10*3/UL (ref 150–450)
POTASSIUM SERPL-SCNC: 3.9 MMOL/L (ref 3.5–5.3)
RBC # BLD AUTO: 4.03 X10*6/UL (ref 4–5.2)
RH FACTOR (ANTIGEN D): NORMAL
SODIUM SERPL-SCNC: 139 MMOL/L (ref 136–145)
WBC # BLD AUTO: 14.1 X10*3/UL (ref 4.4–11.3)

## 2024-12-05 PROCEDURE — 80048 BASIC METABOLIC PNL TOTAL CA: CPT

## 2024-12-05 PROCEDURE — 82374 ASSAY BLOOD CARBON DIOXIDE: CPT

## 2024-12-05 PROCEDURE — 85025 COMPLETE CBC W/AUTO DIFF WBC: CPT

## 2024-12-05 PROCEDURE — 86901 BLOOD TYPING SEROLOGIC RH(D): CPT

## 2024-12-05 PROCEDURE — 36415 COLL VENOUS BLD VENIPUNCTURE: CPT

## 2024-12-05 PROCEDURE — 99204 OFFICE O/P NEW MOD 45 MIN: CPT | Performed by: PHYSICIAN ASSISTANT

## 2024-12-05 PROCEDURE — 87081 CULTURE SCREEN ONLY: CPT | Mod: TRILAB

## 2024-12-05 RX ORDER — HYDROCODONE BITARTRATE AND ACETAMINOPHEN 5; 325 MG/1; MG/1
1 TABLET ORAL EVERY 6 HOURS PRN
COMMUNITY

## 2024-12-05 RX ORDER — CHLORHEXIDINE GLUCONATE ORAL RINSE 1.2 MG/ML
SOLUTION DENTAL
Qty: 473 ML | Refills: 0 | Status: SHIPPED | OUTPATIENT
Start: 2024-12-05 | End: 2024-12-19

## 2024-12-05 ASSESSMENT — DUKE ACTIVITY SCORE INDEX (DASI)
CAN YOU PARTICIPATE IN STRENOUS SPORTS LIKE SWIMMING, SINGLES TENNIS, FOOTBALL, BASKETBALL, OR SKIING: NO
CAN YOU DO HEAVY WORK AROUND THE HOUSE LIKE SCRUBBING FLOORS OR LIFTING AND MOVING HEAVY FURNITURE: YES
CAN YOU DO YARD WORK LIKE RAKING LEAVES, WEEDING OR PUSHING A MOWER: YES
CAN YOU DO LIGHT WORK AROUND THE HOUSE LIKE DUSTING OR WASHING DISHES: YES
TOTAL_SCORE: 50.7
CAN YOU RUN A SHORT DISTANCE: YES
DASI METS SCORE: 9
CAN YOU WALK INDOORS, SUCH AS AROUND YOUR HOUSE: YES
CAN YOU TAKE CARE OF YOURSELF (EAT, DRESS, BATHE, OR USE TOILET): YES
CAN YOU CLIMB A FLIGHT OF STAIRS OR WALK UP A HILL: YES
CAN YOU DO MODERATE WORK AROUND THE HOUSE LIKE VACUUMING, SWEEPING FLOORS OR CARRYING GROCERIES: YES
CAN YOU WALK A BLOCK OR TWO ON LEVEL GROUND: YES
CAN YOU PARTICIPATE IN MODERATE RECREATIONAL ACTIVITIES LIKE GOLF, BOWLING, DANCING, DOUBLES TENNIS OR THROWING A BASEBALL OR FOOTBALL: YES
CAN YOU HAVE SEXUAL RELATIONS: YES

## 2024-12-05 NOTE — H&P (VIEW-ONLY)
"CPM/PAT Evaluation       Name: Emmie De La Cruz (Emmie De La Cruz)  /Age: 1995/29 y.o.     In-Person       Chief Complaint: \"heavy periods\"    HPI  The patient is a 29 year old female with a long history of endometriosis and heavy periods.  She has a period every month, lasting 7 days.  She has heavy bleeding on all 7 days with associated clots, bloating, cramping and back pain.  She has chronic pelvic pain which is more severe during her menstrual cycle and radiates to the left lower abdominal quadrant.  She has been followed by Dr. Russo and a hysterectomy is recommended at this time.    Past Medical History:   Diagnosis Date    Anxiety     Bipolar disorder     Endometriosis        Past Surgical History:   Procedure Laterality Date    CERVICAL BIOPSY  W/ LOOP ELECTRODE EXCISION      COLPOSCOPY      DILATION AND CURETTAGE OF UTERUS      MR HEAD ANGIO WO IV CONTRAST  2018    MR HEAD ANGIO WO IV CONTRAST [facility]    MR NECK ANGIO WO IV CONTRAST  2019    MR NECK ANGIO WO IV CONTRAST [facility]    PELVIC LAPAROSCOPY      TUBAL LIGATION       Family History   Problem Relation Name Age of Onset    Cervical cancer Mother      No Known Problems Father      No Known Problems Daughter      Cervical cancer Father's Sister      Diabetes Maternal Grandmother      Hypertension Maternal Grandmother      Cervical cancer Paternal Grandmother      Colon cancer Paternal Grandfather      Melanoma Paternal Grandfather      Breast cancer Other          paternal family history     Social History     Tobacco Use    Smoking status: Every Day     Current packs/day: 1.00     Average packs/day: 1 pack/day for 17.9 years (17.9 ttl pk-yrs)     Types: Cigarettes     Start date:     Smokeless tobacco: Never   Substance Use Topics    Alcohol use: Not Currently     Social History     Substance and Sexual Activity   Drug Use Never     Allergies   Allergen Reactions    Nitrofurantoin " Monohyd/M-Cryst Other     Reaction: vomiting     Current Outpatient Medications   Medication Sig Dispense Refill    ACETAMINOPHEN ORAL Take 2 tablets by mouth every 6 hours if needed (pain).      HYDROcodone-acetaminophen (Norco) 5-325 mg tablet Take 1 tablet by mouth every 6 hours if needed for severe pain (7 - 10).      ibuprofen 600 mg tablet Take 1 tablet (600 mg) by mouth every 6 hours if needed for mild pain (1 - 3).      medroxyPROGESTERone (Provera) 5 mg tablet Take 1 tablet (5 mg) by mouth once daily. 30 tablet 3     No current facility-administered medications for this visit.     Review of Systems   Genitourinary:  Positive for menstrual problem and pelvic pain.   All other systems reviewed and are negative.    /66   Pulse 94   Temp 37.1 °C (98.8 °F) (Temporal)   Resp 16   Ht 1.524 m (5')   Wt 67.1 kg (148 lb)   LMP 10/28/2024 (Exact Date)   SpO2 98%   BMI 28.90 kg/m²     Physical Exam  Vitals reviewed.   Constitutional:       Appearance: Normal appearance.   HENT:      Head: Normocephalic and atraumatic.      Mouth/Throat:      Mouth: Mucous membranes are moist.      Pharynx: Oropharynx is clear.   Eyes:      Extraocular Movements: Extraocular movements intact.      Pupils: Pupils are equal, round, and reactive to light.   Cardiovascular:      Rate and Rhythm: Normal rate and regular rhythm.      Heart sounds: Normal heart sounds.   Pulmonary:      Effort: Pulmonary effort is normal.      Breath sounds: Normal breath sounds.   Abdominal:      General: Bowel sounds are normal.      Palpations: Abdomen is soft.      Tenderness: There is abdominal tenderness (mild tenderness with palpation left lower quadrant).   Musculoskeletal:         General: No swelling.   Skin:     General: Skin is warm and dry.      Comments: Multiple facial piercings noted   Neurological:      General: No focal deficit present.      Mental Status: She is alert and oriented to person, place, and time.   Psychiatric:          Mood and Affect: Mood normal.         Behavior: Behavior normal.        PAT AIRWAY:   Airway:     Mallampati::  III    TM distance::  >3 FB    Neck ROM::  Full   Teeth intact    ASA:  II  DASI SCORE:  50.7  METS SCORE:  9  CHAD2 SCORE:  1.9%  REVISED CARDIAC RISK INDEX:  0.4%  STOP BANG SCORE:  1  CAPRINI DVT SCORE:  4  INES SCORE:  0.07%  ARISCAT SCORE:  1.6%    CBC, BMP, TYPE AND SCREEN, MRSA ordered during PAT visit    Assessment and Plan:     Pelvic pain in female, endometriosis determined by laparoscopy:  Robot assisted laparoscopic hysterectomy  Cigarette smoker    Tonie Rosales PA-C

## 2024-12-05 NOTE — PREPROCEDURE INSTRUCTIONS
Preoperative Fasting Guidelines    Why must I stop eating and drinking near surgery time?  With sedation, food or liquid in your stomach can enter your lungs causing serious complications  Increases nausea and vomiting    When do I need to stop eating and drinking before my surgery?  Do not eat any food after midnight the night before your surgery/procedure.  You may have up to 13.5 ounces of clear liquid until TWO hours before your instructed arrival time to the hospital.  This includes water, black tea/coffee, (no milk or cream) apple juice, and electrolyte drinks (Gatorade) -PLEASE DRINK YOUR LAST ENSRURE DRINK AT THIS TIME  You may chew gum until TWO hours before your surgery/procedure    PAT DISCHARGE INSTRUCTIONS    Please call the Same Day Surgery (SDS) Department of the hospital where your procedure will be performed after 2:00 PM the day before your surgery. If you are scheduled on a Monday, or a Tuesday following a Monday holiday, you will need to call on the last business day prior to your surgery.    [place] [id number] [phone]  Second Floor      Please let your surgeon know if:      You develop any open sores, shingles, burning or painful urination as these may increase your risk of an infection.   You no longer wish to have the surgery.   Any other personal circumstances change that may lead to the need to cancel or defer this surgery-such as being sick or getting admitted to any hospital within one week of your planned procedure.    Your contact details change, such as a change of address or phone number.    Starting now:     Please DO NOT drink alcohol or smoke for 24 hours before surgery. It is well known that quitting smoking can make a huge difference to your health and recovery from surgery. The longer you abstain from smoking, the better your chances of a healthy recovery. If you need help with quitting, call 2-385-QUIT-NOW  to be connected to a trained counselor who will discuss the best methods to help you quit.     Before your surgery:    Please stop all supplements 7 days prior to surgery. Or as directed by your surgeon.   Please stop taking NSAID pain medicine such as Advil and Motrin 7 days before surgery.    If you develop any fever, cough, cold, rashes, cuts, scratches, scrapes, urinary symptoms or infection anywhere on your body (including teeth and gums) prior to surgery, please call your surgeon’s office as soon as possible. This may require treatment to reduce the chance of cancellation on the day of surgery.  PLEASE REVIEW PRINTED ERAS INSTRUCTIONS FOR KIT THAT WAS MAILED TO YOU.    The day before your surgery:   DIET- Please follow the diet instructions at the top of your packet.   Get a good night’s rest.  Use the special soap for bathing if you have been instructed to use one.    Scheduled surgery times may change and you will be notified if this occurs - please check your personal voicemail for any updates.     On the morning of surgery:   Wear comfortable, loose fitting clothes which open in the front. Please do not wear moisturizers, creams, lotions, makeup or perfume.    Please bring with you to surgery:   Photo ID and insurance card   Current list of medicines and allergies   Pacemaker/ Defibrillator/Heart stent cards   CPAP machine and mask    Slings/ splints/ crutches   A copy of your complete advanced directive/DHPOA.    Please do NOT bring with you to surgery:   All jewelry and valuables should be left at home.   Prosthetic devices such as contact lenses, hearing aids, dentures, eyelash extensions, hairpins and body piercings must be removed prior to going in to the surgical suite.    After outpatient surgery:   A responsible adult MUST accompany you at the time of discharge and stay with you for 24 hours after your surgery. You may NOT drive yourself home after surgery.    Do not drive, operate machinery, make  critical decisions or do activities that require co-ordination or balance until after a night’s sleep.   Do not drink alcoholic beverages for 24 hours.   Instructions for resuming your medications will be provided by your surgeon.    CALL YOUR DOCTOR AFTER SURGERY IF YOU HAVE:     Chills and/or a fever of 101° F or higher.    Redness, swelling, pus or drainage from your surgical wound or a bad smell from the wound.    Lightheadedness, fainting or confusion.    Persistent vomiting (throwing up) and are not able to eat or drink for 12 hours.    Three or more loose, watery bowel movements in 24 hours (diarrhea).   Difficulty or pain while urinating( after non-urological surgery)    Pain and swelling in your legs, especially if it is only on one side.    Difficulty breathing or are breathing faster than normal.    Any new concerning symptoms.        Patient Information: Pre-Operative Infection Prevention Measures     Why did I have my nose, under my arms, and groin swabbed?  The purpose of the swab is to identify Staphylococcus aureus inside your nose or on your skin.  The swab was sent to the laboratory for culture.  A positive swab/culture for Staphylococcus aureus is called colonization or carriage.      What is Staphylococcus aureus?  Staphylococcus aureus, also known as “staph”, is a germ found on the skin or in the nose of healthy people.  Sometimes Staphylococcus aureus can get into the body and cause an infection.  This can be minor (such as pimples, boils, or other skin problems).  It might also be serious (such as a blood infection, pneumonia, or a surgical site infection).    What is Staphylococcus aureus colonization or carriage?  Colonization or carriage means that a person has the germ but is not sick from it.  These bacteria can be spread on the hands or when breathing or sneezing.    How is Staphylococcus aureus spread?  It is most often spread by close contact with a person or item that carries  it.    What happens if my culture is positive for Staphylococcus aureus?  Your doctor/medical team will use this information to guide any antibiotic treatment which may be necessary.  Regardless of the culture results, we will clean the inside of your nose with a betadine swab just before you have your surgery.      Will I get an infection if I have Staphylococcus aureus in my nose or on my skin?  Anyone can get an infection with Staphylococcus aureus.  However, the best way to reduce your risk of infection is to follow the instructions provided to you for the use of your CHG soap and dental rinse.        Patient Information: Oral/Dental Rinse    What is oral/dental rinse?   It is a mouthwash. It is a way of cleaning the mouth with a germ-killing solution before your surgery.  The solution contains chlorhexidine, commonly known as CHG.   It is used inside the mouth to kill a bacteria known as Staphylococcus aureus.  Let your doctor know if you are allergic to Chlorhexidine.    Why do I need to use CHG oral/dental rinse?  The CHG oral/dental rinse helps to kill a bacteria in your mouth known as Staphylococcus aureus.     This reduces the risk of infection at the surgical site.      Using your CHG oral/dental rinse  STEPS:  Use your CHG oral/dental rinse after you brush your teeth the night before (at bedtime) and the morning of your surgery.  Follow all directions on your prescription label.    Use the cap on the container to measure 15ml   Swish (gargle if you can) the mouthwash in your mouth for at least 30 seconds, (do not swallow) and spit out  After you use your CHG rinse, do not rinse your mouth with water, drink or eat.  Please refer to the prescription label for the appropriate time to resume oral intake      What side effects might I have using the CHG oral/dental rinse?  CHG rinse will stick to plaque on the teeth.  Brush and floss just before use.  Teeth brushing will help avoid staining of plaque during  use.      Patient Information: Home Preoperative Antibacterial Shower      What is a home preoperative antibacterial shower?  This shower is a way of cleaning the skin with a germ-killing solution before surgery.  The solution contains chlorhexidine, commonly known as CHG.  CHG is a skin cleanser with germ-killing ability.  Let your doctor know if you are allergic to chlorhexidine.    Why do I need to take a preoperative antibacterial shower?  Skin is not sterile.  It is best to try to make your skin as free of germs as possible before surgery.  Proper cleansing with a germ-killing soap before surgery can lower the number of germs on your skin.  This helps to reduce the risk of infection at the surgical site.  Following the instructions listed below will help you prepare your skin for surgery.      How do I use the solution?  Steps:  Begin using your CHG soap 5 days before your scheduled surgery on ________________________.    First, wash and rinse your hair using the CHG soap. Keep CHG soap away from ear canals and eyes.  Rinse completely, do not condition.  Hair extensions should be removed.  Wash your face with your normal soap and rinse.    Apply the CHG solution to a clean wet washcloth.  Turn the water off or move away from the water spray to avoid premature rinsing of the CHG soap as you are applying.   Firmly lather your entire body from the neck down.  Do not use on your face.  Pay special attention to the area(s) where your incision(s) will be located unless they are on your face.  Avoid scrubbing your skin too hard.  The important point is to have the CHG soap sit on your skin for 3 minutes.    When the 3 minutes are up, turn on the water and rinse the CHG solution off your body completely.   DO NOT wash with regular soap after you have used the CHG soap solution  Pat yourself dry with a clean, freshly-laundered towel.  DO NOT apply powders, deodorants, or lotions.  Dress in clean, freshly laundered  nightclothes.    Be sure to sleep with clean, freshly laundered sheets.  Be aware that CHG will cause stains on fabrics; if you wash them with bleach after use.  Rinse your washcloth and other linens that have contact with CHG completely.  Use only non-chlorine detergents to launder the items used.   The morning of surgery is the fifth day.  Repeat the above steps and dress in clean comfortable clothing     Whom should I contact if I have any questions regarding the use of CHG soap?  Call the [facility] at [phone] if you have any questions.                  Medication List            Accurate as of December 5, 2024  9:35 AM. Always use your most recent med list.                ACETAMINOPHEN ORAL  Medication Adjustments for Surgery: Take/Use as prescribed     HYDROcodone-acetaminophen 5-325 mg tablet  Commonly known as: Norco  Medication Adjustments for Surgery: Take/Use as prescribed     ibuprofen 600 mg tablet  Additional Medication Adjustments for Surgery: Take last dose 7 days before surgery     medroxyPROGESTERone 5 mg tablet  Commonly known as: Provera  Take 1 tablet (5 mg) by mouth once daily.  Medication Adjustments for Surgery: Take/Use as prescribed                                                Patient and Family Education Quitting Smoking or Tobacco       Recognizing Dangerous Situations:   Alcohol use during the first month after quitting   Being around smoke or someone who smokes    Times situation routinely smoked   Triggers: car, breaks, coffee, when awakening, social events     Coping Skills:   Learning new ways to manage stress   Exercising   Relaxation breathing   Change routines   Distraction techniques     Websites:   Smoke-Free - offers free text messages and an jeniffer to help you quit. Info includes eating and mood issues that may come with quitting. There is a Live Helpline to talk to an expert. Go to smokefree.gov      Become an Ex-Smoker - the free EX Plan is based on scientific research and useful advice from ex-smokers. It isn't just about quitting smoking. It's about re-learning life without cigarettes using a 3-step program. Go to becomeanex.org     Centers for Disease Control - offer many suggestions for helping you quit. Includes a Quit Guide and real-life stories. There are sections for specific groups such as LGBT, , different ethnic groups, and pregnant women. Go to cdc.gov/tobacco/campaign/tips     Other Resources:     [place] Tobacco Quit Line - call 1-800-QUIT-NOW or 5-262-839-3921.   [employer] [facility] 2-1-1 - to find local programs and resources. Call 211 or go to [employer] [facility]. [facility] Tobacco Cessation Program - call [phone].   American Lung Association - offers classes for quitting smoking. Some places may charge a fee. For a list of classes, go to lung.org or call 4-812-[facility]UN[facility]idbook[facility].     Some things to think about:   The health benefits of quitting smoking can help most of the major parts of your body.   There is no safe amount of cigarette smoke. Quitting smoking can add years to your life.   When you quit, you'll also protect your loved ones from dangerous secondhand smoke.   Make a plan, join a support group, and talk to your physician to assist in quitting smoking.     [facility], 11/20; [id number] (6/22)

## 2024-12-07 LAB — STAPHYLOCOCCUS SPEC CULT: NORMAL

## 2024-12-19 ENCOUNTER — ANESTHESIA EVENT (OUTPATIENT)
Dept: OPERATING ROOM | Facility: HOSPITAL | Age: 29
End: 2024-12-19
Payer: COMMERCIAL

## 2024-12-19 ENCOUNTER — ANESTHESIA (OUTPATIENT)
Dept: OPERATING ROOM | Facility: HOSPITAL | Age: 29
End: 2024-12-19
Payer: COMMERCIAL

## 2024-12-19 ENCOUNTER — PHARMACY VISIT (OUTPATIENT)
Dept: PHARMACY | Facility: CLINIC | Age: 29
End: 2024-12-19
Payer: MEDICAID

## 2024-12-19 ENCOUNTER — HOSPITAL ENCOUNTER (OUTPATIENT)
Facility: HOSPITAL | Age: 29
Setting detail: OUTPATIENT SURGERY
Discharge: HOME | End: 2024-12-19
Attending: OBSTETRICS & GYNECOLOGY | Admitting: OBSTETRICS & GYNECOLOGY
Payer: COMMERCIAL

## 2024-12-19 VITALS
DIASTOLIC BLOOD PRESSURE: 59 MMHG | RESPIRATION RATE: 12 BRPM | WEIGHT: 147.93 LBS | BODY MASS INDEX: 28.89 KG/M2 | TEMPERATURE: 98.4 F | OXYGEN SATURATION: 98 % | SYSTOLIC BLOOD PRESSURE: 107 MMHG | HEART RATE: 70 BPM

## 2024-12-19 DIAGNOSIS — N80.9 ENDOMETRIOSIS DETERMINED BY LAPAROSCOPY: ICD-10-CM

## 2024-12-19 DIAGNOSIS — R10.2 PELVIC PAIN IN FEMALE: Primary | ICD-10-CM

## 2024-12-19 DIAGNOSIS — G89.18 ACUTE POSTOPERATIVE PAIN: ICD-10-CM

## 2024-12-19 LAB
ABO GROUP (TYPE) IN BLOOD: NORMAL
PREGNANCY TEST URINE, POC: NEGATIVE
RH FACTOR (ANTIGEN D): NORMAL

## 2024-12-19 PROCEDURE — 88341 IMHCHEM/IMCYTCHM EA ADD ANTB: CPT | Performed by: PATHOLOGY

## 2024-12-19 PROCEDURE — 2720000007 HC OR 272 NO HCPCS: Performed by: OBSTETRICS & GYNECOLOGY

## 2024-12-19 PROCEDURE — 2500000004 HC RX 250 GENERAL PHARMACY W/ HCPCS (ALT 636 FOR OP/ED): Mod: JZ | Performed by: OBSTETRICS & GYNECOLOGY

## 2024-12-19 PROCEDURE — 81025 URINE PREGNANCY TEST: CPT | Performed by: ANESTHESIOLOGY

## 2024-12-19 PROCEDURE — 3700000001 HC GENERAL ANESTHESIA TIME - INITIAL BASE CHARGE: Performed by: OBSTETRICS & GYNECOLOGY

## 2024-12-19 PROCEDURE — 88307 TISSUE EXAM BY PATHOLOGIST: CPT | Mod: TC,AHULAB,TRILAB,WESLAB | Performed by: OBSTETRICS & GYNECOLOGY

## 2024-12-19 PROCEDURE — 88307 TISSUE EXAM BY PATHOLOGIST: CPT | Performed by: PATHOLOGY

## 2024-12-19 PROCEDURE — 58570 TLH UTERUS 250 G OR LESS: CPT

## 2024-12-19 PROCEDURE — 36415 COLL VENOUS BLD VENIPUNCTURE: CPT | Performed by: OBSTETRICS & GYNECOLOGY

## 2024-12-19 PROCEDURE — 7100000001 HC RECOVERY ROOM TIME - INITIAL BASE CHARGE: Performed by: OBSTETRICS & GYNECOLOGY

## 2024-12-19 PROCEDURE — 2500000004 HC RX 250 GENERAL PHARMACY W/ HCPCS (ALT 636 FOR OP/ED): Performed by: OBSTETRICS & GYNECOLOGY

## 2024-12-19 PROCEDURE — 2500000005 HC RX 250 GENERAL PHARMACY W/O HCPCS: Performed by: NURSE ANESTHETIST, CERTIFIED REGISTERED

## 2024-12-19 PROCEDURE — RXMED WILLOW AMBULATORY MEDICATION CHARGE

## 2024-12-19 PROCEDURE — 7100000009 HC PHASE TWO TIME - INITIAL BASE CHARGE: Performed by: OBSTETRICS & GYNECOLOGY

## 2024-12-19 PROCEDURE — 2500000001 HC RX 250 WO HCPCS SELF ADMINISTERED DRUGS (ALT 637 FOR MEDICARE OP): Performed by: OBSTETRICS & GYNECOLOGY

## 2024-12-19 PROCEDURE — 7100000010 HC PHASE TWO TIME - EACH INCREMENTAL 1 MINUTE: Performed by: OBSTETRICS & GYNECOLOGY

## 2024-12-19 PROCEDURE — 3600000018 HC OR TIME - INITIAL BASE CHARGE - PROCEDURE LEVEL SIX: Performed by: OBSTETRICS & GYNECOLOGY

## 2024-12-19 PROCEDURE — 2780000003 HC OR 278 NO HCPCS: Performed by: OBSTETRICS & GYNECOLOGY

## 2024-12-19 PROCEDURE — 3600000017 HC OR TIME - EACH INCREMENTAL 1 MINUTE - PROCEDURE LEVEL SIX: Performed by: OBSTETRICS & GYNECOLOGY

## 2024-12-19 PROCEDURE — 2500000002 HC RX 250 W HCPCS SELF ADMINISTERED DRUGS (ALT 637 FOR MEDICARE OP, ALT 636 FOR OP/ED): Performed by: ANESTHESIOLOGY

## 2024-12-19 PROCEDURE — 2500000004 HC RX 250 GENERAL PHARMACY W/ HCPCS (ALT 636 FOR OP/ED): Performed by: NURSE ANESTHETIST, CERTIFIED REGISTERED

## 2024-12-19 PROCEDURE — 88342 IMHCHEM/IMCYTCHM 1ST ANTB: CPT | Performed by: PATHOLOGY

## 2024-12-19 PROCEDURE — 3700000002 HC GENERAL ANESTHESIA TIME - EACH INCREMENTAL 1 MINUTE: Performed by: OBSTETRICS & GYNECOLOGY

## 2024-12-19 PROCEDURE — 2500000004 HC RX 250 GENERAL PHARMACY W/ HCPCS (ALT 636 FOR OP/ED): Performed by: ANESTHESIOLOGY

## 2024-12-19 PROCEDURE — A58570 PR LAPAROSCOPY W TOT HYSTERECT UTERUS 250 GRAM OR LESS: Performed by: ANESTHESIOLOGY

## 2024-12-19 PROCEDURE — 7100000002 HC RECOVERY ROOM TIME - EACH INCREMENTAL 1 MINUTE: Performed by: OBSTETRICS & GYNECOLOGY

## 2024-12-19 PROCEDURE — A58570 PR LAPAROSCOPY W TOT HYSTERECT UTERUS 250 GRAM OR LESS: Performed by: NURSE ANESTHETIST, CERTIFIED REGISTERED

## 2024-12-19 PROCEDURE — C1765 ADHESION BARRIER: HCPCS | Performed by: OBSTETRICS & GYNECOLOGY

## 2024-12-19 RX ORDER — OXYCODONE AND ACETAMINOPHEN 5; 325 MG/1; MG/1
1 TABLET ORAL EVERY 6 HOURS PRN
Status: CANCELLED | OUTPATIENT
Start: 2024-12-19

## 2024-12-19 RX ORDER — HYDRALAZINE HYDROCHLORIDE 20 MG/ML
10 INJECTION INTRAMUSCULAR; INTRAVENOUS EVERY 30 MIN PRN
Status: DISCONTINUED | OUTPATIENT
Start: 2024-12-19 | End: 2024-12-19 | Stop reason: HOSPADM

## 2024-12-19 RX ORDER — ONDANSETRON HYDROCHLORIDE 2 MG/ML
INJECTION, SOLUTION INTRAVENOUS AS NEEDED
Status: DISCONTINUED | OUTPATIENT
Start: 2024-12-19 | End: 2024-12-19

## 2024-12-19 RX ORDER — ACETAMINOPHEN 10 MG/ML
1000 INJECTION, SOLUTION INTRAVENOUS ONCE
Status: COMPLETED | OUTPATIENT
Start: 2024-12-19 | End: 2024-12-19

## 2024-12-19 RX ORDER — FENTANYL CITRATE 50 UG/ML
INJECTION, SOLUTION INTRAMUSCULAR; INTRAVENOUS AS NEEDED
Status: DISCONTINUED | OUTPATIENT
Start: 2024-12-19 | End: 2024-12-19

## 2024-12-19 RX ORDER — CEFAZOLIN SODIUM 2 G/100ML
2 INJECTION, SOLUTION INTRAVENOUS ONCE
Status: COMPLETED | OUTPATIENT
Start: 2024-12-19 | End: 2024-12-19

## 2024-12-19 RX ORDER — METOCLOPRAMIDE 10 MG/1
10 TABLET ORAL ONCE
Status: CANCELLED | OUTPATIENT
Start: 2024-12-19 | End: 2024-12-19

## 2024-12-19 RX ORDER — ACETAMINOPHEN 325 MG/1
975 TABLET ORAL ONCE
Status: COMPLETED | OUTPATIENT
Start: 2024-12-19 | End: 2024-12-19

## 2024-12-19 RX ORDER — LIDOCAINE HYDROCHLORIDE AND EPINEPHRINE 10; 10 UG/ML; MG/ML
INJECTION, SOLUTION INFILTRATION; PERINEURAL AS NEEDED
Status: DISCONTINUED | OUTPATIENT
Start: 2024-12-19 | End: 2024-12-19 | Stop reason: HOSPADM

## 2024-12-19 RX ORDER — LIDOCAINE HYDROCHLORIDE 10 MG/ML
0.1 INJECTION, SOLUTION EPIDURAL; INFILTRATION; INTRACAUDAL; PERINEURAL ONCE
Status: DISCONTINUED | OUTPATIENT
Start: 2024-12-19 | End: 2024-12-19 | Stop reason: HOSPADM

## 2024-12-19 RX ORDER — FAMOTIDINE 20 MG/1
20 TABLET, FILM COATED ORAL ONCE
Status: CANCELLED | OUTPATIENT
Start: 2024-12-19 | End: 2024-12-19

## 2024-12-19 RX ORDER — KETOROLAC TROMETHAMINE 30 MG/ML
INJECTION, SOLUTION INTRAMUSCULAR; INTRAVENOUS AS NEEDED
Status: DISCONTINUED | OUTPATIENT
Start: 2024-12-19 | End: 2024-12-19

## 2024-12-19 RX ORDER — LIDOCAINE HYDROCHLORIDE 10 MG/ML
INJECTION, SOLUTION INFILTRATION; PERINEURAL AS NEEDED
Status: DISCONTINUED | OUTPATIENT
Start: 2024-12-19 | End: 2024-12-19

## 2024-12-19 RX ORDER — ONDANSETRON HYDROCHLORIDE 2 MG/ML
4 INJECTION, SOLUTION INTRAVENOUS EVERY 8 HOURS PRN
Status: CANCELLED | OUTPATIENT
Start: 2024-12-19

## 2024-12-19 RX ORDER — ONDANSETRON 4 MG/1
4 TABLET, ORALLY DISINTEGRATING ORAL ONCE AS NEEDED
Status: CANCELLED | OUTPATIENT
Start: 2024-12-19

## 2024-12-19 RX ORDER — PROPOFOL 10 MG/ML
INJECTION, EMULSION INTRAVENOUS AS NEEDED
Status: DISCONTINUED | OUTPATIENT
Start: 2024-12-19 | End: 2024-12-19

## 2024-12-19 RX ORDER — IBUPROFEN 600 MG/1
600 TABLET ORAL EVERY 6 HOURS PRN
Status: CANCELLED | OUTPATIENT
Start: 2024-12-19

## 2024-12-19 RX ORDER — KETOROLAC TROMETHAMINE 30 MG/ML
15 INJECTION, SOLUTION INTRAMUSCULAR; INTRAVENOUS ONCE AS NEEDED
Status: DISCONTINUED | OUTPATIENT
Start: 2024-12-19 | End: 2024-12-19 | Stop reason: HOSPADM

## 2024-12-19 RX ORDER — ONDANSETRON HYDROCHLORIDE 2 MG/ML
4 INJECTION, SOLUTION INTRAVENOUS ONCE AS NEEDED
Status: COMPLETED | OUTPATIENT
Start: 2024-12-19 | End: 2024-12-19

## 2024-12-19 RX ORDER — ALBUTEROL SULFATE 0.83 MG/ML
2.5 SOLUTION RESPIRATORY (INHALATION) ONCE AS NEEDED
Status: DISCONTINUED | OUTPATIENT
Start: 2024-12-19 | End: 2024-12-19 | Stop reason: HOSPADM

## 2024-12-19 RX ORDER — OXYCODONE HCL 10 MG/1
10 TABLET, FILM COATED, EXTENDED RELEASE ORAL ONCE AS NEEDED
Status: CANCELLED | OUTPATIENT
Start: 2024-12-19

## 2024-12-19 RX ORDER — IBUPROFEN 600 MG/1
600 TABLET ORAL EVERY 6 HOURS PRN
Qty: 30 TABLET | Refills: 0 | Status: SHIPPED | OUTPATIENT
Start: 2024-12-19 | End: 2025-01-07 | Stop reason: SDUPTHER

## 2024-12-19 RX ORDER — ACETAMINOPHEN 325 MG/1
650 TABLET ORAL EVERY 6 HOURS PRN
Qty: 30 TABLET | Refills: 0 | Status: SHIPPED | OUTPATIENT
Start: 2024-12-19

## 2024-12-19 RX ORDER — LABETALOL HYDROCHLORIDE 5 MG/ML
10 INJECTION, SOLUTION INTRAVENOUS ONCE AS NEEDED
Status: DISCONTINUED | OUTPATIENT
Start: 2024-12-19 | End: 2024-12-19 | Stop reason: HOSPADM

## 2024-12-19 RX ORDER — NORETHINDRONE AND ETHINYL ESTRADIOL 0.5-0.035
50 KIT ORAL ONCE
Status: DISCONTINUED | OUTPATIENT
Start: 2024-12-19 | End: 2024-12-19 | Stop reason: HOSPADM

## 2024-12-19 RX ORDER — MIDAZOLAM HYDROCHLORIDE 1 MG/ML
INJECTION, SOLUTION INTRAMUSCULAR; INTRAVENOUS AS NEEDED
Status: DISCONTINUED | OUTPATIENT
Start: 2024-12-19 | End: 2024-12-19

## 2024-12-19 RX ORDER — DIPHENHYDRAMINE HYDROCHLORIDE 50 MG/ML
12.5 INJECTION INTRAMUSCULAR; INTRAVENOUS ONCE AS NEEDED
Status: DISCONTINUED | OUTPATIENT
Start: 2024-12-19 | End: 2024-12-19 | Stop reason: HOSPADM

## 2024-12-19 RX ORDER — CELECOXIB 200 MG/1
400 CAPSULE ORAL ONCE
Status: COMPLETED | OUTPATIENT
Start: 2024-12-19 | End: 2024-12-19

## 2024-12-19 RX ORDER — MIDAZOLAM HYDROCHLORIDE 1 MG/ML
1 INJECTION, SOLUTION INTRAMUSCULAR; INTRAVENOUS ONCE AS NEEDED
Status: DISCONTINUED | OUTPATIENT
Start: 2024-12-19 | End: 2024-12-19 | Stop reason: HOSPADM

## 2024-12-19 RX ORDER — HYDROCODONE BITARTRATE AND ACETAMINOPHEN 5; 325 MG/1; MG/1
1 TABLET ORAL EVERY 6 HOURS PRN
Qty: 20 TABLET | Refills: 0 | Status: SHIPPED | OUTPATIENT
Start: 2024-12-19 | End: 2025-01-07 | Stop reason: SDUPTHER

## 2024-12-19 RX ORDER — OXYCODONE HYDROCHLORIDE 5 MG/1
5 TABLET ORAL ONCE AS NEEDED
Status: CANCELLED | OUTPATIENT
Start: 2024-12-19

## 2024-12-19 RX ORDER — ROCURONIUM BROMIDE 10 MG/ML
INJECTION, SOLUTION INTRAVENOUS AS NEEDED
Status: DISCONTINUED | OUTPATIENT
Start: 2024-12-19 | End: 2024-12-19

## 2024-12-19 RX ORDER — ALBUTEROL SULFATE 0.83 MG/ML
2.5 SOLUTION RESPIRATORY (INHALATION) ONCE
Status: COMPLETED | OUTPATIENT
Start: 2024-12-19 | End: 2024-12-19

## 2024-12-19 RX ADMIN — ACETAMINOPHEN 975 MG: 325 TABLET ORAL at 10:57

## 2024-12-19 RX ADMIN — HYDROMORPHONE HYDROCHLORIDE 0.5 MG: 2 INJECTION, SOLUTION INTRAMUSCULAR; INTRAVENOUS; SUBCUTANEOUS at 17:16

## 2024-12-19 RX ADMIN — HYDROMORPHONE HYDROCHLORIDE 0.5 MG: 2 INJECTION, SOLUTION INTRAMUSCULAR; INTRAVENOUS; SUBCUTANEOUS at 17:09

## 2024-12-19 RX ADMIN — ACETAMINOPHEN 1000 MG: 10 INJECTION INTRAVENOUS at 17:29

## 2024-12-19 RX ADMIN — ALBUTEROL SULFATE 2.5 MG: 2.5 SOLUTION RESPIRATORY (INHALATION) at 10:57

## 2024-12-19 RX ADMIN — ONDANSETRON 4 MG: 2 INJECTION INTRAMUSCULAR; INTRAVENOUS at 17:14

## 2024-12-19 RX ADMIN — CELECOXIB 400 MG: 200 CAPSULE ORAL at 10:57

## 2024-12-19 RX ADMIN — HYDROMORPHONE HYDROCHLORIDE 0.5 MG: 2 INJECTION, SOLUTION INTRAMUSCULAR; INTRAVENOUS; SUBCUTANEOUS at 17:48

## 2024-12-19 RX ADMIN — HYDROMORPHONE HYDROCHLORIDE 0.5 MG: 2 INJECTION, SOLUTION INTRAMUSCULAR; INTRAVENOUS; SUBCUTANEOUS at 18:20

## 2024-12-19 ASSESSMENT — PAIN DESCRIPTION - ORIENTATION
ORIENTATION: LOWER

## 2024-12-19 ASSESSMENT — PAIN DESCRIPTION - DESCRIPTORS
DESCRIPTORS: CRAMPING
DESCRIPTORS: CRAMPING

## 2024-12-19 ASSESSMENT — PAIN SCALES - GENERAL
PAINLEVEL_OUTOF10: 7
PAINLEVEL_OUTOF10: 5 - MODERATE PAIN
PAINLEVEL_OUTOF10: 10 - WORST POSSIBLE PAIN
PAINLEVEL_OUTOF10: 5 - MODERATE PAIN
PAIN_LEVEL: 2
PAINLEVEL_OUTOF10: 10 - WORST POSSIBLE PAIN
PAINLEVEL_OUTOF10: 10 - WORST POSSIBLE PAIN
PAINLEVEL_OUTOF10: 7

## 2024-12-19 ASSESSMENT — PAIN SCALES - PAIN ASSESSMENT IN ADVANCED DEMENTIA (PAINAD): TOTALSCORE: COLD PACK;MEDICATION (SEE MAR)

## 2024-12-19 ASSESSMENT — PAIN - FUNCTIONAL ASSESSMENT
PAIN_FUNCTIONAL_ASSESSMENT: 0-10

## 2024-12-19 ASSESSMENT — COLUMBIA-SUICIDE SEVERITY RATING SCALE - C-SSRS
2. HAVE YOU ACTUALLY HAD ANY THOUGHTS OF KILLING YOURSELF?: NO
6. HAVE YOU EVER DONE ANYTHING, STARTED TO DO ANYTHING, OR PREPARED TO DO ANYTHING TO END YOUR LIFE?: NO
1. IN THE PAST MONTH, HAVE YOU WISHED YOU WERE DEAD OR WISHED YOU COULD GO TO SLEEP AND NOT WAKE UP?: NO

## 2024-12-19 ASSESSMENT — PAIN DESCRIPTION - LOCATION
LOCATION: ABDOMEN

## 2024-12-19 NOTE — ANESTHESIA PREPROCEDURE EVALUATION
Patient: Emmie De La Cruz    Procedure Information       Date/Time: 12/19/24 1135    Procedure: Robot Assisted Laparoscopic Hysterectomy    Location: [facility] / [facility]    Surgeons: Tia Russo, DO            Relevant Problems   Neuro   (+) Anxiety disorder, unspecified   (+) Bipolar disorder      GYN   (+) Endometriosis   (+) Endometriosis determined by laparoscopy   (+) Menorrhagia with regular cycle       Clinical information reviewed:   Tobacco  Allergies  Meds   Med Hx  Surg Hx  OB Status  Fam Hx  Soc   Hx        NPO Detail:  NPO/Void Status  Date of Last Liquid: 12/18/24  Time of Last Liquid: 2100  Date of Last Solid: 12/18/24  Time of Last Solid: 2100  Time of Last Void: 1034         Physical Exam    Airway  Mallampati: II  TM distance: >3 FB     Cardiovascular   Rhythm: regular  Rate: normal     Dental - normal exam     Pulmonary    Abdominal            Anesthesia Plan    History of general anesthesia?: yes  History of complications of general anesthesia?: no    ASA 3     general   (Labs acceptable, )  intravenous induction   Postoperative administration of opioids is intended.  Anesthetic plan and risks discussed with patient.    Plan discussed with CRNA, attending and CAA.

## 2024-12-19 NOTE — ANESTHESIA PROCEDURE NOTES
Airway  Date/Time: 12/19/2024 2:50 PM  Urgency: elective      Staffing  Performed: CRNA   Authorized by: Glen Dotson DO    Performed by: DELICIA Crow-CRNA  Patient location during procedure: OR    Indications and Patient Condition  Indications for airway management: anesthesia  Spontaneous Ventilation: absent  Sedation level: deep  Preoxygenated: yes  Patient position: sniffing  Mask difficulty assessment: 1 - vent by mask    Final Airway Details  Final airway type: endotracheal airway      Successful airway: ETT  Cuffed: yes   Successful intubation technique: direct laryngoscopy  Facilitating devices/methods: intubating stylet  Blade: Suhail  Blade size: #3  ETT size (mm): 7.0  Cormack-Lehane Classification: grade I - full view of glottis  Placement verified by: chest auscultation and capnometry   Measured from: lips  ETT to lips (cm): 22  Number of attempts at approach: 1

## 2024-12-19 NOTE — OP NOTE
Robot Assisted Laparoscopic Hysterectomy Operative Note     Date: 2024  OR Location: [facility]    Name: Emmie De La Cruz, : 1995, Age: 29 y.o., MRN: [id number], Sex: female    Diagnosis  Pre-op Diagnosis      * Pelvic pain in female [R10.2]     * Endometriosis determined by laparoscopy [N80.9] Post-op Diagnosis     * Pelvic pain in female [R10.2]     * Endometriosis determined by laparoscopy [N80.9]     Procedures  Robot Assisted Laparoscopic Hysterectomy  19540 - ME LAPAROSCOPY W TOTAL HYSTERECTOMY UTERUS 250 GM/<      Surgeons      * Tia Russo - Primary    Resident/Fellow/Other Assistant:  Surgeons and Role:  * No surgeons found with a matching role *    Staff:   Circulator: Kaylah Martin Person: Stanislav Martin Person: Ervin  Circulator: Yuri    Anesthesia Staff: Anesthesiologist: Glen Dotson DO  CRNA: DELICIA Crow-CRNA    Procedure Summary  Anesthesia: General  ASA: III  Estimated Blood Loss: 50 mL  Intra-op Medications:   Administrations occurring from 1135 to 1525 on 24:   Medication Name Total Dose   ceFAZolin (Ancef) 2 g in dextrose (iso)  mL 2 g   dexAMETHasone (Decadron) 4 mg/mL 4 mg   dexmedeTOMIDine (Precedex) bolus from bag 12 mcg   fentaNYL PF 0.05 mg/mL 100 mcg   LR bolus Cannot be calculated   lidocaine (Xylocaine) 1 % 5 mL   midazolam (Versed) 1 mg/1 mL 2 mg   ondansetron 2 mg/mL 4 mg   propofol (Diprivan) injection 10 mg/mL 200 mg   rocuronium (ZeMuron) 50 mg/5 mL injection 50 mg              Anesthesia Record               Intraprocedure I/O Totals          Intake    Dexmedetomidine 0.00 mL    The total shown is the total volume documented since Anesthesia Start was filed.    LR bolus 900.00 mL    Total Intake 900 mL       Output    Est. Blood Loss 20 mL    Total Output 20 mL       Net    Net Volume 880 mL          Specimen:   ID Type Source Tests Collected by Time   1 : Uterus and Cervix Tissue UTERUS WITH CERVIX SURGICAL PATHOLOGY EXAM Tia  KANNAN KaranDO 12/19/2024 1634                 Drains and/or Catheters: * None in log *    Tourniquet Times:         Implants:     Findings: s/p bilateral salpingectomy.  Endomeriosis noted at broad ligmaent bilaterally.  8 cm Dahlia tip with 3.5 cm Koh ring for Dahlia manipulator used.      Indications: Emmie De La Cruz is an 29 y.o. female who is having surgery for Pelvic pain in female [R10.2]  Endometriosis determined by laparoscopy [N80.9]. Filed conservative tx.  Wants to maintain ovaries.    The patient was seen in the preoperative area. The risks, benefits, complications, treatment options, non-operative alternatives, expected recovery and outcomes were discussed with the patient. The possibilities of reaction to medication, pulmonary aspiration, injury to surrounding structures, bleeding, recurrent infection, the need for additional procedures, failure to diagnose a condition, and creating a complication requiring transfusion or operation were discussed with the patient. The patient concurred with the proposed plan, giving informed consent.  The site of surgery was properly noted/marked if necessary per policy. The patient has been actively warmed in preoperative area. Preoperative antibiotics have been ordered and given within 1 hours of incision. Venous thrombosis prophylaxis have been ordered including bilateral sequential compression devices    Procedure Details:     After review of informed consent, patient was taken to the operative room where a general anesthetic was administered and found to be adequate.  She was prepped and draped in normal sterile fashion in a lithotomy position in yellowfin stirrups.  At 4 cm above the umbilicus, local was injected through which an 8 mm incision was made and a Veress needle was then passed and pneumoperitoneum established.  An 8 mm operative trocar was advanced under direct visualization.  Right and left operative trocars were placed in the right and left lower  quadrants without difficulty.  Follow-up with placement of a 12 mm Excel trocar in the right upper quadrant.  I then turned to the patient's perineum where Madison catheter was placed and a weighted speculum is placed the posterior fornix of the vagina.  The antilipid cervix was grasped single-tooth tenaculum and a figure-of-eight retention stitch was placed the anterior and posterior lips of the cervix.  Uterus is sounded to 8 cm and was then dilated using Naresh dilators.  A Dahlia manipulator was placed using an 8 cm tip with a 3.5 cm Koh ring for uterine manipulation.  The patient cart was then docked to the patient and I proceeded to the console.    With a force bipolar in the left hand and laparoscopic hot fredy in the right, the tubo-ovarian ligament was divided medial to the ovary uterine vessels were then cauterized and divided followed by cautery and divided of the round ligament.  The anterior leaf of the broad ligament was dissected to create the bladder flap to midline.  The uterine vessels were then further cauterized down to the level cardinal uterosacral ligament on the right.  In a similar manner the tubo-ovarian ligament was divided on the left followed by division of the uterine vessels the broad ligament creation of the anterior bladder flap from the left-hand side and carrying this hysterectomy down to the level of the uterosacral ligament as well.  At this time adequate dusting of the uterine tissue was noted in the pneumo-occluder balloon was inflated.  The anterior colpotomy was performed using laparoscopic Kocher Cristiane's through to the left uterosacral ligament which was cauterized once again then divided the colpotomy was carried around to the right uterosacral ligament which was cauterized then divided and the colotomy completed.  Uterus with cervix was then delivered into the vagina.  The vaginal cuff was irrigated was found to be hemostatic.  2 oh V-Loc suture was then used to close the  vaginal cuff starting with the right apex to midline and then 3 stitches backwards to form a 2 layer closure from the right-hand side with similar anchoring of the left apex of the vagina bringing this to midline and back to form a 2 layer closure on the left.  Excellent hemostasis is noted once again after irrigation.  Interceed was then placed across the vaginal cuff.  The remaining pedicles are with good hemostasis and the ovaries appeared normal.  The endometriosis which was noted was removed at the time of the hysterectomy and I do not see any additional endometriosis in the posterior cul-de-sac area behind the ovaries bilaterally.      The patient cart was then undocked and instruments were then removed from the abdomen.  The fascia at the 12 mm trocar site was closed using 0 Vicryl the skin incisions were then closed using 4-0 Monocryl.  Bludigo was given IV and cystoscopy was performed using a 21 Chinese 70 degree cystoscope.  Vigorous Bludigo beatriz urine was noted on the ureteral jets bilaterally.  There is no injury to the bladder dome.  Madison catheter was left out.  Sponge lap needle counts correct x 2, patient was taken to recovery in stable condition.      Complications:  None; patient tolerated the procedure well.    Disposition: PACU - hemodynamically stable.  Condition: stable             Task Performed by LAMONTE First Assist or Physician Assistant:   Beth PINA/MARIANNE, was necessary to assist on this case due to the nature of the case and difficulty. During the case assist by operating the camera, passing retracting tissue, passing and cutting suture, and changing instruments at bedside while I was at the surgical console.  Also assisted with closing.      Additional Details:     Attending Attestation: I performed the procedure.    Tia Russo  Phone Number: [phone]

## 2024-12-19 NOTE — ANESTHESIA POSTPROCEDURE EVALUATION
Patient: Emmie De La Cruz    Procedure Summary       Date: 12/19/24 Room / Location: [facility] / [facility]    Anesthesia Start: 1441 Anesthesia Stop: 1659    Procedure: Robot Assisted Laparoscopic Hysterectomy Diagnosis:       Pelvic pain in female      Endometriosis determined by laparoscopy      (Pelvic pain in female [R10.2])      (Endometriosis determined by laparoscopy [N80.9])    Surgeons: Tia Russo DO Responsible Provider: Glen Dotson DO    Anesthesia Type: general ASA Status: 3            Anesthesia Type: general  /65   Pulse 79   Temp 37.2 °C (99 °F) (Temporal)   Resp 18   Wt 67.1 kg (147 lb 14.9 oz)   SpO2 99%   BMI 28.89 kg/m²         Anesthesia Post Evaluation    Patient location during evaluation: bedside  Patient participation: complete - patient participated  Level of consciousness: awake  Pain score: 2  Pain management: adequate  Airway patency: patent  Two or more strategies used to mitigate risk of obstructive sleep apnea  Cardiovascular status: acceptable  Respiratory status: acceptable  Hydration status: acceptable  Postoperative Nausea and Vomiting: none        No notable events documented.

## 2024-12-20 ENCOUNTER — TELEPHONE (OUTPATIENT)
Dept: OBSTETRICS AND GYNECOLOGY | Facility: CLINIC | Age: 29
End: 2024-12-20
Payer: COMMERCIAL

## 2024-12-20 DIAGNOSIS — K59.00 CONSTIPATION, UNSPECIFIED CONSTIPATION TYPE: Primary | ICD-10-CM

## 2024-12-20 RX ORDER — DOCUSATE SODIUM 100 MG/1
100 CAPSULE, LIQUID FILLED ORAL 2 TIMES DAILY PRN
Qty: 30 CAPSULE | Refills: 5 | Status: SHIPPED | OUTPATIENT
Start: 2024-12-20

## 2024-12-20 NOTE — TELEPHONE ENCOUNTER
Pt had surgery yesterday with Dr. Russo. She wants to know, how long until she can shower? Could you also Send an rx for stool softener? She feels constipated, and hasn't gone yet since surgery

## 2024-12-24 ENCOUNTER — HOSPITAL ENCOUNTER (EMERGENCY)
Facility: HOSPITAL | Age: 29
Discharge: LEFT BEFORE DISCHARGE | End: 2024-12-24
Payer: COMMERCIAL

## 2024-12-24 ENCOUNTER — TELEPHONE (OUTPATIENT)
Dept: OBSTETRICS AND GYNECOLOGY | Facility: CLINIC | Age: 29
End: 2024-12-24
Payer: COMMERCIAL

## 2024-12-24 PROCEDURE — 4500999001 HC ED NO CHARGE

## 2024-12-24 NOTE — TELEPHONE ENCOUNTER
Dr. Russo patient had Hysterotomy on 12/19/2024 and c/o pain since she got discharged from hospital. Pt took Acetaminophen 975 mg tablet and Ibuprofen 600 mg tablet were prescribed by Dr. Russo and still didn't and not helping with the pain. Currently c/o pain: 10/10  below belly button and by the ribs. Pt. Can't move at all. Requested patient to go to ER for further evaluation and patient stated she will go to [facility] ER.

## 2025-01-03 ENCOUNTER — APPOINTMENT (OUTPATIENT)
Dept: OBSTETRICS AND GYNECOLOGY | Facility: CLINIC | Age: 30
End: 2025-01-03
Payer: COMMERCIAL

## 2025-01-07 ENCOUNTER — OFFICE VISIT (OUTPATIENT)
Dept: OBSTETRICS AND GYNECOLOGY | Facility: CLINIC | Age: 30
End: 2025-01-07
Payer: COMMERCIAL

## 2025-01-07 VITALS
WEIGHT: 145.4 LBS | HEIGHT: 60 IN | SYSTOLIC BLOOD PRESSURE: 110 MMHG | BODY MASS INDEX: 28.54 KG/M2 | DIASTOLIC BLOOD PRESSURE: 58 MMHG

## 2025-01-07 DIAGNOSIS — G89.18 ACUTE POSTOPERATIVE PAIN: ICD-10-CM

## 2025-01-07 PROCEDURE — 99211 OFF/OP EST MAY X REQ PHY/QHP: CPT | Performed by: OBSTETRICS & GYNECOLOGY

## 2025-01-07 PROCEDURE — 3008F BODY MASS INDEX DOCD: CPT | Performed by: OBSTETRICS & GYNECOLOGY

## 2025-01-07 RX ORDER — HYDROCODONE BITARTRATE AND ACETAMINOPHEN 5; 325 MG/1; MG/1
1 TABLET ORAL EVERY 6 HOURS PRN
Qty: 10 TABLET | Refills: 0 | Status: SHIPPED | OUTPATIENT
Start: 2025-01-07

## 2025-01-07 RX ORDER — IBUPROFEN 600 MG/1
600 TABLET ORAL EVERY 6 HOURS PRN
Qty: 30 TABLET | Refills: 0 | Status: SHIPPED | OUTPATIENT
Start: 2025-01-07

## 2025-01-07 ASSESSMENT — PATIENT HEALTH QUESTIONNAIRE - PHQ9
SUM OF ALL RESPONSES TO PHQ9 QUESTIONS 1 AND 2: 0
1. LITTLE INTEREST OR PLEASURE IN DOING THINGS: NOT AT ALL
2. FEELING DOWN, DEPRESSED OR HOPELESS: NOT AT ALL

## 2025-01-07 ASSESSMENT — ENCOUNTER SYMPTOMS
OCCASIONAL FEELINGS OF UNSTEADINESS: 0
DEPRESSION: 0
LOSS OF SENSATION IN FEET: 0

## 2025-01-07 ASSESSMENT — PAIN SCALES - GENERAL: PAINLEVEL_OUTOF10: 8

## 2025-01-07 NOTE — PROGRESS NOTES
POSTOP VISIT         Patient Name:  Emmie De La Cruz  :  1995  MR #:  [id number]  Acct #:  [id number]      SUBJECTIVE:      Emmie De La Cruz 29 y.o.  presents s/p  RATLH . She is doing well; repots possible wound infection.      OBJECTIVE:   /58 Comment: PT. C/O NAUSEA  Ht 1.524 m (5')   Wt 66 kg (145 lb 6.4 oz)   LMP  (LMP Unknown)   BMI 28.40 kg/m²   Body mass index is 28.4 kg/m².     Physical Exam  General: NAD, AAOx3  HEENT: Normocephalic, without obvious abnormality, atraumatic.   Heart:: regular rate and rhythm, no murmurs appreciated  Lungs: normal work of breathing  Abdomen: soft, non-tender; no masses, no organomegaly. Incision mild inflammation at RUQ incision, with small amount of suture extruding out, suture removed.  There is a crease with inflammation along her upper abdomen, suspect friction and not infection.  Pelvic: deferred  Extremities: full ROM of all extremities   Skin: Skin color, texture, turgor normal. No rashes or lesions        ASSESSMENT/PLAN:     PO 2 weeks s/p RATLH    Will refill pain rx, reviewed addictive potential.  No additional refills after this.          Problem List Items Addressed This Visit    None  Visit Diagnoses       Acute postoperative pain        Relevant Medications    ibuprofen 600 mg tablet    HYDROcodone-acetaminophen (Norco) 5-325 mg tablet            Follow up in about 4 weeks (around 2025) for post op.

## 2025-01-08 LAB
LAB AP ASR DISCLAIMER: NORMAL
LABORATORY COMMENT REPORT: NORMAL
PATH REPORT.FINAL DX SPEC: NORMAL
PATH REPORT.GROSS SPEC: NORMAL
PATH REPORT.RELEVANT HX SPEC: NORMAL
PATH REPORT.TOTAL CANCER: NORMAL

## 2025-01-29 ENCOUNTER — OFFICE VISIT (OUTPATIENT)
Dept: OBSTETRICS AND GYNECOLOGY | Facility: CLINIC | Age: 30
End: 2025-01-29
Payer: COMMERCIAL

## 2025-01-29 VITALS
SYSTOLIC BLOOD PRESSURE: 100 MMHG | WEIGHT: 145.6 LBS | BODY MASS INDEX: 28.58 KG/M2 | HEIGHT: 60 IN | DIASTOLIC BLOOD PRESSURE: 78 MMHG

## 2025-01-29 DIAGNOSIS — Z48.89 POSTOPERATIVE VISIT: Primary | ICD-10-CM

## 2025-01-29 DIAGNOSIS — N99.820 POSTOPERATIVE VAGINAL BLEEDING FOLLOWING GENITOURINARY PROCEDURE: ICD-10-CM

## 2025-01-29 PROCEDURE — 99211 OFF/OP EST MAY X REQ PHY/QHP: CPT | Performed by: OBSTETRICS & GYNECOLOGY

## 2025-01-29 PROCEDURE — 3008F BODY MASS INDEX DOCD: CPT | Performed by: OBSTETRICS & GYNECOLOGY

## 2025-01-29 RX ORDER — ESTRADIOL 0.1 MG/G
1 CREAM VAGINAL NIGHTLY
Qty: 42.5 G | Refills: 3 | Status: SHIPPED | OUTPATIENT
Start: 2025-01-29 | End: 2026-01-29

## 2025-01-29 ASSESSMENT — ENCOUNTER SYMPTOMS
OCCASIONAL FEELINGS OF UNSTEADINESS: 0
LOSS OF SENSATION IN FEET: 0
DEPRESSION: 0

## 2025-01-29 ASSESSMENT — PAIN SCALES - GENERAL: PAINLEVEL_OUTOF10: 0-NO PAIN

## 2025-01-29 NOTE — PROGRESS NOTES
POSTOP VISIT         Patient Name:  Emmie De La Cruz  :  1995  MR #:  [id number]  Acct #:  [id number]      SUBJECTIVE:      Emmie De La Cruz 29 y.o.  presents s/p  RALTH . She is doing well; notes light vaginal bleeding, now 2 weeks postop.  Has returned to work doing laundry x 2 days.  Observing pelvic rest and 20# wt restrictions      OBJECTIVE:   /78   Ht 1.524 m (5')   Wt 66 kg (145 lb 9.6 oz)   LMP  (LMP Unknown)   BMI 28.44 kg/m²   Body mass index is 28.44 kg/m².     Physical Exam  General: NAD, AAOx3  HEENT: Normocephalic, without obvious abnormality, atraumatic.   Heart:: regular rate and rhythm, no murmurs appreciated  Lungs: normal work of breathing  Abdomen: soft, non-tender; no masses, no organomegaly. Incision clean, dry, intact  Pelvic: lt apex with slight separatoin.    Extremities: full ROM of all extremities   Skin: Skin color, texture, turgor normal. No rashes or lesions    Pathology:  pending      ASSESSMENT/PLAN:     PO 2 weeks s/p RATLH        Postoperative vaginal bleeding following genitourinary procedure  Pt off work due to bleeding.  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  Start estrace.      Problem List Items Addressed This Visit       Postoperative vaginal bleeding following genitourinary procedure     Pt off work due to bleeding.  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  Start estrace.           Relevant Medications    estradiol (Estrace) 0.01 % (0.1 mg/gram) vaginal cream     Other Visit Diagnoses       Postoperative visit    -  Primary            No follow-ups on file.

## 2025-02-03 PROBLEM — R10.2 PELVIC PAIN IN FEMALE: Status: RESOLVED | Noted: 2024-10-25 | Resolved: 2025-02-03

## 2025-02-03 PROBLEM — N92.0 MENORRHAGIA WITH REGULAR CYCLE: Status: RESOLVED | Noted: 2023-09-25 | Resolved: 2025-02-03

## 2025-02-03 PROBLEM — N94.6 DYSMENORRHEA: Status: RESOLVED | Noted: 2023-09-25 | Resolved: 2025-02-03

## 2025-02-03 PROBLEM — N80.9 ENDOMETRIOSIS: Status: RESOLVED | Noted: 2023-09-25 | Resolved: 2025-02-03

## 2025-02-03 PROBLEM — N91.2 AMENORRHEA DUE TO DEPO PROVERA: Status: RESOLVED | Noted: 2023-09-25 | Resolved: 2025-02-03

## 2025-02-03 PROBLEM — N99.820 POSTOPERATIVE VAGINAL BLEEDING FOLLOWING GENITOURINARY PROCEDURE: Status: ACTIVE | Noted: 2025-02-03

## 2025-02-13 ENCOUNTER — APPOINTMENT (OUTPATIENT)
Dept: OBSTETRICS AND GYNECOLOGY | Facility: CLINIC | Age: 30
End: 2025-02-13
Payer: COMMERCIAL

## 2025-02-18 ENCOUNTER — OFFICE VISIT (OUTPATIENT)
Dept: OBSTETRICS AND GYNECOLOGY | Facility: CLINIC | Age: 30
End: 2025-02-18
Payer: COMMERCIAL

## 2025-02-18 VITALS
HEIGHT: 60 IN | BODY MASS INDEX: 28.45 KG/M2 | WEIGHT: 144.9 LBS | DIASTOLIC BLOOD PRESSURE: 82 MMHG | SYSTOLIC BLOOD PRESSURE: 120 MMHG

## 2025-02-18 DIAGNOSIS — Z48.89 POSTOPERATIVE VISIT: Primary | ICD-10-CM

## 2025-02-18 PROCEDURE — 99211 OFF/OP EST MAY X REQ PHY/QHP: CPT | Performed by: OBSTETRICS & GYNECOLOGY

## 2025-02-18 PROCEDURE — 3008F BODY MASS INDEX DOCD: CPT | Performed by: OBSTETRICS & GYNECOLOGY

## 2025-02-18 ASSESSMENT — ENCOUNTER SYMPTOMS
LOSS OF SENSATION IN FEET: 0
OCCASIONAL FEELINGS OF UNSTEADINESS: 0
DEPRESSION: 0

## 2025-02-18 ASSESSMENT — PAIN SCALES - GENERAL: PAINLEVEL_OUTOF10: 0-NO PAIN

## 2025-03-03 NOTE — PROGRESS NOTES
POSTOP VISIT         Patient Name:  Emmie De La Cruz  :  1995  MR #:  [id number]  Acct #:  [id number]      SUBJECTIVE:      Emmie De La Cruz 29 y.o.  presents s/p  RATLH . She is doing well; denies complaints.       OBJECTIVE:   /82   Ht 1.524 m (5')   Wt 65.7 kg (144 lb 14.4 oz)   LMP  (LMP Unknown)   BMI 28.30 kg/m²   Body mass index is 28.3 kg/m².     Physical Exam  General: NAD, AAOx3  HEENT: Normocephalic, without obvious abnormality, atraumatic.   Heart:: regular rate and rhythm, no murmurs appreciated  Lungs: normal work of breathing  Abdomen: soft, non-tender; no masses, no organomegaly. Incision healing well.  Pelvic: cuff healing well, no vaginal bleeding  Extremities: full ROM of all extremities   Skin: Skin color, texture, turgor normal. No rashes or lesions          ASSESSMENT/PLAN:     PO 6 weeks s/p RATLH.          Problem List Items Addressed This Visit    None  Visit Diagnoses       Postoperative visit    -  Primary          No restrictions.  Follow up for well woman exam.

## 2025-03-31 ENCOUNTER — OFFICE VISIT (OUTPATIENT)
Dept: URGENT CARE | Age: 30
End: 2025-03-31
Payer: COMMERCIAL

## 2025-03-31 VITALS
HEIGHT: 60 IN | OXYGEN SATURATION: 100 % | RESPIRATION RATE: 19 BRPM | HEART RATE: 109 BPM | DIASTOLIC BLOOD PRESSURE: 76 MMHG | TEMPERATURE: 98.2 F | BODY MASS INDEX: 27.48 KG/M2 | WEIGHT: 140 LBS | SYSTOLIC BLOOD PRESSURE: 124 MMHG

## 2025-03-31 DIAGNOSIS — H60.331 ACUTE SWIMMER'S EAR OF RIGHT SIDE: ICD-10-CM

## 2025-03-31 DIAGNOSIS — J01.10 ACUTE NON-RECURRENT FRONTAL SINUSITIS: Primary | ICD-10-CM

## 2025-03-31 PROCEDURE — 3008F BODY MASS INDEX DOCD: CPT | Performed by: SURGERY

## 2025-03-31 PROCEDURE — 99203 OFFICE O/P NEW LOW 30 MIN: CPT | Performed by: SURGERY

## 2025-03-31 RX ORDER — AMOXICILLIN AND CLAVULANATE POTASSIUM 875; 125 MG/1; MG/1
1 TABLET, FILM COATED ORAL 2 TIMES DAILY
Qty: 20 TABLET | Refills: 0 | Status: SHIPPED | OUTPATIENT
Start: 2025-03-31 | End: 2025-04-10

## 2025-03-31 RX ORDER — CIPROFLOXACIN AND DEXAMETHASONE 3; 1 MG/ML; MG/ML
4 SUSPENSION/ DROPS AURICULAR (OTIC) 2 TIMES DAILY
Qty: 7.5 ML | Refills: 0 | Status: SHIPPED | OUTPATIENT
Start: 2025-03-31 | End: 2025-04-07

## 2025-03-31 NOTE — LETTER
March 31, 2025     Patient: Emmie De La Cruz   YOB: 1995   Date of Visit: 3/31/2025       To Whom It May Concern:    Emmie De La Cruz was seen in my clinic on 3/31/2025 at 11:00 am. Please excuse Emmie for her absence     from work until _______________________________________      If you have any questions or concerns, please don't hesitate to call.         Sincerely,         Naina Chapa,         CC: No Recipients

## 2025-03-31 NOTE — PROGRESS NOTES
Chief Complaint   Patient presents with    Headache    Earache     Started Friday.       Physical Exam:     GEN: No acute distress    ENT: Bilateral TMs bulging with serous effusions, R>L, R canals swollen and erythematous without exudate, sinus congestion present. Fright fontal and maxillary sinus tender to finger tap. Pharynx and tonsils mildly hyperemic but without exudate.     Resp: Lungs clear to auscultation bilaterally           Encounter Diagnoses   Name Primary?    Acute non-recurrent frontal sinusitis Yes    Acute swimmer's ear of right side         Medical Decision Making & Plan:   Augmentin   Ciprodex drops     Home      03/31/25 at 12:40 PM - Naina Chapa, DO

## (undated) DEVICE — TUBING, INSUFFLATION, W/ .3 MICRO FILTER & ADAPTER

## (undated) DEVICE — CARE KIT, LAPAROSCOPIC, ADVANCED

## (undated) DEVICE — ADHESIVE, SKIN, DERMABOND ADVANCED, 15CM, PEN-STYLE

## (undated) DEVICE — GLOVE, SURGICAL, PROTEXIS PI BLUE W/NEUTHERA, 6.0, PF, LF

## (undated) DEVICE — DRAPE, SHEET, LARGE, 70 X 85IN, STERILE

## (undated) DEVICE — POSITIONING SYSTEM, PAGAZZI, PATIENT

## (undated) DEVICE — SPONGE, BARRIER ADHESION, OXIDIZED CELLULOSE, INTERCEED, 3 X 4 IN

## (undated) DEVICE — SUTURE, VICRYL, 0, 36 IN, CT-1, UNDYED

## (undated) DEVICE — DRAPE, MAYO STAND, STERILE-Z

## (undated) DEVICE — SUTURE, MONOCRYL, 4-0, 27 IN, PS-2, UNDYED

## (undated) DEVICE — CATHETER TRAY, URETHRAL, FOLEY, 16 FR, SILICONE

## (undated) DEVICE — GRASPER,FORCE, BIPOLAR, DAVINCI XI

## (undated) DEVICE — Device

## (undated) DEVICE — ASSEMBLY, STRYKER FLOW 2, SUCTION IRRIGATOR, WITH TIP

## (undated) DEVICE — DRAPE PACK, LAVH, W/ATTACHED LEGGINGS, W/POUCH, 100 X 114 IN, LF, STERILE

## (undated) DEVICE — SUTURE, VICRYL, 0, 27 IN, UR-6, VIOLET

## (undated) DEVICE — TIP, RUMI BLUE 6.7MM X 8CM

## (undated) DEVICE — POSITIONING, THE PINK PAD, PIGAZZI SYSTEM

## (undated) DEVICE — SEAL, UNIVERSAL 5-8MM  XI

## (undated) DEVICE — DRAPE, INSTRUMENT, W/POUCH, STERI DRAPE, 9 5/8 X 18 LONG

## (undated) DEVICE — SYRINGE, 50 CC, LUER LOCK

## (undated) DEVICE — SYRINGE, TOOMEY, IRRIGATION, 60ML, INDIVIDUAL WRAP, STERILE

## (undated) DEVICE — STAPLER, SKIN, PLUS, WIDE, 35

## (undated) DEVICE — GLOVE, SURGICAL, PROTEXIS PI , 5.5, PF, LF

## (undated) DEVICE — TUBE SET, PNEUMOCLEAR, SMOKE EVACU, HIGH-FLOW

## (undated) DEVICE — DEVICE, KOH-EFFICIENT, RUMI II, 3.5, LF

## (undated) DEVICE — SUTURE, VICRYL, 3-0, 27 IN, SH, VIOLET